# Patient Record
Sex: FEMALE | Race: WHITE | NOT HISPANIC OR LATINO | Employment: STUDENT | URBAN - METROPOLITAN AREA
[De-identification: names, ages, dates, MRNs, and addresses within clinical notes are randomized per-mention and may not be internally consistent; named-entity substitution may affect disease eponyms.]

---

## 2024-01-16 ENCOUNTER — OFFICE VISIT (OUTPATIENT)
Dept: INTERNAL MEDICINE | Facility: CLINIC | Age: 24
End: 2024-01-16
Payer: MEDICAID

## 2024-01-16 VITALS
WEIGHT: 293 LBS | TEMPERATURE: 98 F | OXYGEN SATURATION: 98 % | DIASTOLIC BLOOD PRESSURE: 60 MMHG | HEIGHT: 66 IN | HEART RATE: 91 BPM | SYSTOLIC BLOOD PRESSURE: 112 MMHG | BODY MASS INDEX: 47.09 KG/M2

## 2024-01-16 DIAGNOSIS — Z00.00 HEALTH CARE MAINTENANCE: ICD-10-CM

## 2024-01-16 DIAGNOSIS — M79.672 LEFT FOOT PAIN: ICD-10-CM

## 2024-01-16 DIAGNOSIS — J32.9 SINUSITIS, UNSPECIFIED CHRONICITY, UNSPECIFIED LOCATION: Primary | ICD-10-CM

## 2024-01-16 PROCEDURE — 3078F DIAST BP <80 MM HG: CPT | Mod: CPTII,,, | Performed by: PHYSICIAN ASSISTANT

## 2024-01-16 PROCEDURE — 3074F SYST BP LT 130 MM HG: CPT | Mod: CPTII,,, | Performed by: PHYSICIAN ASSISTANT

## 2024-01-16 PROCEDURE — 1159F MED LIST DOCD IN RCRD: CPT | Mod: CPTII,,, | Performed by: PHYSICIAN ASSISTANT

## 2024-01-16 PROCEDURE — 99205 OFFICE O/P NEW HI 60 MIN: CPT | Mod: PBBFAC | Performed by: PHYSICIAN ASSISTANT

## 2024-01-16 PROCEDURE — 1160F RVW MEDS BY RX/DR IN RCRD: CPT | Mod: CPTII,,, | Performed by: PHYSICIAN ASSISTANT

## 2024-01-16 PROCEDURE — 99204 OFFICE O/P NEW MOD 45 MIN: CPT | Mod: S$PBB,,, | Performed by: PHYSICIAN ASSISTANT

## 2024-01-16 PROCEDURE — 99999 PR PBB SHADOW E&M-NEW PATIENT-LVL V: CPT | Mod: PBBFAC,,, | Performed by: PHYSICIAN ASSISTANT

## 2024-01-16 PROCEDURE — 3008F BODY MASS INDEX DOCD: CPT | Mod: CPTII,,, | Performed by: PHYSICIAN ASSISTANT

## 2024-01-16 RX ORDER — GUAIFENESIN AND PSEUDOEPHEDRINE HCL 1200; 120 MG/1; MG/1
1200 TABLET, EXTENDED RELEASE ORAL 2 TIMES DAILY
COMMUNITY
Start: 2024-01-08

## 2024-01-17 NOTE — PROGRESS NOTES
Subjective:       Patient ID: Tawnya Sanz is a 23 y.o. female.        Chief Complaint: Referral (Referral to ENT and Gyno)    Tawnya Sanz is an established patient of , Primary Doctor here today for urgent care visit.    She had a CT of her sinuses when in [place], where she is from.  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  She was told she needed surgery so asking for an ENT referral.  She has chronic post nasal drip and cough x 3 months.  She has pain in her left maxillary sinus and teeth.    She needs a GYN referral for routine exam.    She needs a PCP.      She has chronic left foot pain x 2 years and requests podiatry referral.           Review of Systems   Constitutional:  Negative for chills, diaphoresis, fatigue and fever.   HENT:  Positive for postnasal drip, sinus pressure and sinus pain. Negative for congestion and sore throat.    Eyes:  Negative for visual disturbance.   Respiratory:  Positive for cough. Negative for chest tightness and shortness of breath.    Cardiovascular:  Negative for chest pain, palpitations and leg swelling.   Gastrointestinal:  Negative for abdominal pain, blood in stool, constipation, diarrhea, nausea and vomiting.   Genitourinary:  Negative for dysuria, frequency, hematuria and urgency.   Musculoskeletal:  Positive for arthralgias. Negative for back pain.   Skin:  Negative for rash.   Neurological:  Negative for dizziness, syncope, weakness and headaches.   Psychiatric/Behavioral:  Negative for dysphoric mood and sleep disturbance. The patient is not nervous/anxious.        Objective:      Physical Exam  Vitals and nursing note reviewed.   Constitutional:       Appearance: Normal appearance. She is well-developed.   HENT:      Head: Normocephalic.      Right Ear: Tympanic membrane and external ear normal.      Left Ear: Tympanic membrane and external ear normal.      Nose: No mucosal edema or rhinorrhea.      Left Sinus: Maxillary sinus tenderness  "present.      Mouth/Throat:      Pharynx: Oropharynx is clear.   Eyes:      Pupils: Pupils are equal, round, and reactive to light.   Cardiovascular:      Rate and Rhythm: Normal rate and regular rhythm.      Heart sounds: Normal heart sounds. No murmur heard.     No friction rub. No gallop.   Pulmonary:      Effort: Pulmonary effort is normal. No respiratory distress.      Breath sounds: Normal breath sounds.   Abdominal:      Palpations: Abdomen is soft.      Tenderness: There is no abdominal tenderness.   Skin:     General: Skin is warm and dry.   Neurological:      Mental Status: She is alert.         Assessment:       1. Sinusitis, unspecified chronicity, unspecified location    2. Health care maintenance    3. Left foot pain        Plan:       Tawnya was seen today for referral.    Diagnoses and all orders for this visit:    Sinusitis, unspecified chronicity, unspecified location  -     Ambulatory referral/consult to ENT; Future    Health care maintenance  -     Ambulatory referral/consult to Gynecology; Future    Left foot pain  -     Ambulatory referral/consult to Podiatry; Future    Referrals placed  Will message Dr. Rao to see if she has any new patient availability    Pt has been given instructions populated from patient instructions database and has verbalized understanding of the after visit summary and information contained wherein.    Follow up with a primary care provider. May go to ER for acute shortness of breath, lightheadedness, fever, or any other emergent complaints or changes in condition.    "This note will be shared with the patient"    Future Appointments   Date Time Provider Department Center   2/20/2024  3:30 PM Cora Malagon MD [facility]   3/1/2024  3:00 PM Sampson Jiménez MD [facility]                 "

## 2024-01-22 ENCOUNTER — OFFICE VISIT (OUTPATIENT)
Dept: OTOLARYNGOLOGY | Facility: CLINIC | Age: 24
End: 2024-01-22
Payer: MEDICAID

## 2024-01-22 VITALS
WEIGHT: 293 LBS | DIASTOLIC BLOOD PRESSURE: 78 MMHG | HEART RATE: 90 BPM | SYSTOLIC BLOOD PRESSURE: 124 MMHG | BODY MASS INDEX: 51.45 KG/M2

## 2024-01-22 DIAGNOSIS — J34.3 HYPERTROPHY OF BOTH INFERIOR NASAL TURBINATES: ICD-10-CM

## 2024-01-22 DIAGNOSIS — J32.9 SINUSITIS, UNSPECIFIED CHRONICITY, UNSPECIFIED LOCATION: ICD-10-CM

## 2024-01-22 DIAGNOSIS — J34.2 NASAL SEPTAL DEVIATION: Primary | ICD-10-CM

## 2024-01-22 DIAGNOSIS — J34.1 MUCOCELE OF MAXILLARY SINUS: ICD-10-CM

## 2024-01-22 PROCEDURE — 99213 OFFICE O/P EST LOW 20 MIN: CPT | Mod: PBBFAC | Performed by: STUDENT IN AN ORGANIZED HEALTH CARE EDUCATION/TRAINING PROGRAM

## 2024-01-22 PROCEDURE — 1160F RVW MEDS BY RX/DR IN RCRD: CPT | Mod: CPTII,,, | Performed by: STUDENT IN AN ORGANIZED HEALTH CARE EDUCATION/TRAINING PROGRAM

## 2024-01-22 PROCEDURE — 99205 OFFICE O/P NEW HI 60 MIN: CPT | Mod: 25,S$PBB,, | Performed by: STUDENT IN AN ORGANIZED HEALTH CARE EDUCATION/TRAINING PROGRAM

## 2024-01-22 PROCEDURE — 3008F BODY MASS INDEX DOCD: CPT | Mod: CPTII,,, | Performed by: STUDENT IN AN ORGANIZED HEALTH CARE EDUCATION/TRAINING PROGRAM

## 2024-01-22 PROCEDURE — 99999 PR PBB SHADOW E&M-EST. PATIENT-LVL III: CPT | Mod: PBBFAC,,, | Performed by: STUDENT IN AN ORGANIZED HEALTH CARE EDUCATION/TRAINING PROGRAM

## 2024-01-22 PROCEDURE — 1159F MED LIST DOCD IN RCRD: CPT | Mod: CPTII,,, | Performed by: STUDENT IN AN ORGANIZED HEALTH CARE EDUCATION/TRAINING PROGRAM

## 2024-01-22 PROCEDURE — 3078F DIAST BP <80 MM HG: CPT | Mod: CPTII,,, | Performed by: STUDENT IN AN ORGANIZED HEALTH CARE EDUCATION/TRAINING PROGRAM

## 2024-01-22 PROCEDURE — 3074F SYST BP LT 130 MM HG: CPT | Mod: CPTII,,, | Performed by: STUDENT IN AN ORGANIZED HEALTH CARE EDUCATION/TRAINING PROGRAM

## 2024-01-22 PROCEDURE — 87070 CULTURE OTHR SPECIMN AEROBIC: CPT | Performed by: STUDENT IN AN ORGANIZED HEALTH CARE EDUCATION/TRAINING PROGRAM

## 2024-01-22 PROCEDURE — 87075 CULTR BACTERIA EXCEPT BLOOD: CPT | Performed by: STUDENT IN AN ORGANIZED HEALTH CARE EDUCATION/TRAINING PROGRAM

## 2024-01-22 NOTE — PROGRESS NOTES
Otolaryngology - Head and Neck Surgery New Patient Visit    1/22/2024    Referring Provider: Shawnee Valenzuela PA-C    Chief Complaint   Patient presents with    Sinusitis     History of Present Illness, Otolaryngology Specialty-Specific Exam, and Assessment and Plan:     Tawnya Sanz is a 23 y.o. female who presents for evaluation of recurrent sinus infections, which has been present for 8 months. She complains of having her wisdom teeth pulled in Jan 2023. There was some concern for possible OA fistula but was seen by OMFS in [place]. She reports in April of 2023 started to have dental pain, facial pressure, purulent nasal drainage> Was recently seen in [place] where CT scan was performed that was concerning for mucocele of the left maxillary sinus. 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 She has been treated with amoxicillin, Augmentin and doxycycline previously. She was seen in [place] where a nasal swab was performed, no predominant colonies identified. Since finishing the antibiotics has been using budesonide irrigations. She reports improvement in her symptoms, however cough still persists. She denies vision changes, epistaxis, previous nasal trauma, anosmia, or clear nasal drainage. She has never had allergy testing. She denies previous skullbase surgery. She denies snoring. The assessment of quality and severity of symptoms as measured by the SNOT-22 score is 24 and the STOP-BANG score is 2.     She had a CT of the sinuses done on 12/27/23 which I reviewed along with the associated radiology report.    On exam today, the ears are normal. The oral cavity is clear. The neck is clear. The nasopharynx, hypopharynx, and larynx are normal. Nasal endoscopy reveals left septal deviation with spur. Hypertrophic inferior turbinates bilaterally. No nasal masses or nasal polyposis. Purulent drainage in the left middle meatus with significant edema and inflammation involving the  left uncinate process. Nasopharynx clear without lesions. Eustachian tubes WNL.    Impression today is chronic sinusitis, possible mucocele involving the left maxillary sinus.     She would benefit from STESS for the treatment of her condition.  I discussed the risks, benefits and alternatives to surgery with the patient, as well as the expected postoperative course.  I gave her the opportunity to ask questions and I answered all of them.  I provided relevant printed information on her condition for her to review at home. She will return for a postoperative visit 1 week after surgery.    The risks and benefits of endoscopic surgery were discussed with the patient in detail, which include but are not limited to pain, bleeding, infection, the need for reoperation, injury to orbit or orbital contents, injury to brain or meninges, and unforeseeable complications of surgery including myocardial infarction, stroke or pulmonary embolus.    Cultures of the sinuses were obtained today to see if we can get better antibiotic directed cultures.  She has a pretty significant aversion to undergo general anesthesia with personal history of a family member having an undesirable outcome.  I stressed the if timely surgical intervention is not performed infection may progress outside her sinuses to involve her eye or even intracranially.  My hope is to get antibiotic directed cultures to assist in keeping for infection at a manageable state, however I am concerned with her CT findings showing a possible mucocele with complete obstruction of the left ostiomeatal complex.     Thank you for allowing us to participate in the care of your patient. We will continue to keep you informed of her progress.    Sincerely yours,    Sampson Jiménez MD      Objective     Physical Examination  Vitals -  weight is 144.6 kg (318 lb 12.6 oz) (abnormal). Her blood pressure is 124/78 and her pulse is 90.   Constitutional - General appearance: Normal.  Ability to communicate: Normal.  Head & Face - Overall appearance, scars, masses: Normal. Palpation &/or percussion of face: Normal. Salivary glands: Normal. Facial strength: Normal  ENMT - Otoscopic exam: Normal. Assessment of hearing: Normal. External inspection: Normal. Nasal mucosa, septum, turbinates: Abnormal see exam details. Lips, teeth, gums: Normal. Oropharynx: Normal. Pharyngeal walls/pyriform sinus: Normal. Larynx: Normal. Nasopharynx: Normal  Neck - Neck: Normal. Thyroid: Normal  Lymphatic - Palpation of lymph nodes: Normal  Eyes - Ocular mobility: Normal  Respiratory - Inspection of Chest: Normal  Cardiovascular - Peripheral vascular system: Normal  Neurological/Psychiatric - Orientation: Normal    Review of Systems  A complete review of systems was obtained 01/22/2024 and reviewed.  The review of systems is negative for symptoms except as described above.    /78 (BP Location: Left arm, Patient Position: Sitting, BP Method: Large (Automatic))   Pulse 90   Wt (!) 144.6 kg (318 lb 12.6 oz)   BMI 51.45 kg/m²      Nasal Endoscopy:  1/22/2024    The use of diagnostic nasal endoscopy was considered medically necessary for the evaluation and visualization of the nasal anatomy for symptoms suggestive of nasal or sinus origin. Physical examination (including a nasal speculum evaluation) did not provide sufficient clinical information to establish a diagnosis, or symptoms did not improve or worsened following treatment.     The nasal cavity was decongested with topical 1% phenylephrine and anesthetized with 4% lidocaine.  A rigid 0-degree endoscope was introduced into the nasal cavity.    The patient was seated in the examination chair. After discussion of risks and benefits, a nasal endoscope was inserted into the nose the endoscope was passed along the left nasal floor to the nasopharynx. It was then passed between the middle and superior meatus, nasal turbinates, nasal septum, nasopharynx and  "sphenoethmoid region. The nasal endoscope was withdrawn and there was no complications. An identical procedure was performed on the right side. I was present for the entire procedure.The patient tolerated the above procedure well. The findings of this procedure can be found in the dictated note from 1/22/2024 visit.                                        Data Reviewed    No results found for: "WBC"  No results found for: "EOSINOPHIL"  No results found for: "EOS"  No results found for: "PLT"  No results found for: "GLU"  No results found for: "IGE"    I independently reviewed the images of the CT sinuses dated 12/27/23. Pertinent findings include opacification of the left frontal , ethmoid, maxillary and sphenoid sinuses with bubbles within the sphenoid and frontal sinuses. Expansion of the left maxillary sinus and uncinate with complete [facility] obstruction. Mucosal thickening of the right maxillary sinus with mucosal thickening involving the frontal and ethmoids. Relative sparing of the right sphenoid.                                        "

## 2024-01-23 ENCOUNTER — TELEPHONE (OUTPATIENT)
Dept: INTERNAL MEDICINE | Facility: CLINIC | Age: 24
End: 2024-01-23
Payer: MEDICAID

## 2024-01-23 NOTE — TELEPHONE ENCOUNTER
----- Message from Shawnee Valenzuela PA-C sent at 1/23/2024  6:39 AM CST -----  Does Dr. ARGUELLES have any new patient appointments to establish care left?    [place],  Shawnee

## 2024-01-24 ENCOUNTER — OFFICE VISIT (OUTPATIENT)
Dept: INTERNAL MEDICINE | Facility: CLINIC | Age: 24
End: 2024-01-24
Payer: MEDICAID

## 2024-01-24 ENCOUNTER — PATIENT MESSAGE (OUTPATIENT)
Dept: OTOLARYNGOLOGY | Facility: CLINIC | Age: 24
End: 2024-01-24
Payer: MEDICAID

## 2024-01-24 VITALS
DIASTOLIC BLOOD PRESSURE: 76 MMHG | SYSTOLIC BLOOD PRESSURE: 120 MMHG | BODY MASS INDEX: 47.09 KG/M2 | HEIGHT: 66 IN | WEIGHT: 293 LBS | OXYGEN SATURATION: 98 % | HEART RATE: 100 BPM

## 2024-01-24 DIAGNOSIS — L73.9 FOLLICULITIS: Primary | ICD-10-CM

## 2024-01-24 LAB — BACTERIA SPEC AEROBE CULT: NORMAL

## 2024-01-24 PROCEDURE — 3074F SYST BP LT 130 MM HG: CPT | Mod: CPTII,,,

## 2024-01-24 PROCEDURE — 99999 PR PBB SHADOW E&M-EST. PATIENT-LVL III: CPT | Mod: PBBFAC,,,

## 2024-01-24 PROCEDURE — 3078F DIAST BP <80 MM HG: CPT | Mod: CPTII,,,

## 2024-01-24 PROCEDURE — 99213 OFFICE O/P EST LOW 20 MIN: CPT | Mod: PBBFAC

## 2024-01-24 PROCEDURE — 3008F BODY MASS INDEX DOCD: CPT | Mod: CPTII,,,

## 2024-01-24 PROCEDURE — 99213 OFFICE O/P EST LOW 20 MIN: CPT | Mod: S$PBB,,,

## 2024-01-24 RX ORDER — MUPIROCIN 20 MG/G
OINTMENT TOPICAL 3 TIMES DAILY
Qty: 22 G | Refills: 0 | Status: SHIPPED | OUTPATIENT
Start: 2024-01-24 | End: 2024-01-26 | Stop reason: SDUPTHER

## 2024-01-24 NOTE — PROGRESS NOTES
Tawnya Sanz  2000        Subjective     Reason for Visit:    History of Present Illness:  Ms. Tawnya Sanz is a 23 y.o. female with a history of recurrent sinus infections who presents to clinic for rash on b/l legs.     Rash is located on b/l upper and lower inner thigh. States that last week started as an itch, and then this morning noted a painful rash, which she describes as a painful itchy pimple, one located on either side. 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 Has been using new detergent and was wearing new articles of clothing. Last week shaved and after that noted itching. No history of asthma, atopy, or eczema. No recent outdoor exposure or recent bug bites. Has not occurred before. Has not tried any OTC medications for it. Not associated with increased warmth or fluctuance.     Review of Systems   Constitutional:  Negative for chills and fever.   Respiratory:  Negative for shortness of breath.    Musculoskeletal:  Negative for myalgias.   Skin:  Positive for rash.   Neurological:  Negative for sensory change.        PAST HISTORY:     Past Medical History:   Diagnosis Date    Chronic sinusitis, unspecified     Obesity, unspecified        Past Surgical History:   Procedure Laterality Date    WISDOM TOOTH EXTRACTION         Family History   Problem Relation Age of Onset    Cancer Mother         thyroid    Hypertension Mother     Heart failure Mother     Diabetes Mother     Cancer Father         skin - unsure type       Social History     Socioeconomic History    Marital status: Single   Occupational History    Occupation: medical student   Tobacco Use    Smoking status: Never    Smokeless tobacco: Never   Substance and Sexual Activity    Drug use: Never    Sexual activity: Never   Social History Narrative    Medical student at      Social Determinants of Health     Financial Resource Strain: Low Risk  (1/15/2024)    Overall Financial Resource Strain (CARDIA)     Difficulty of Paying  "Living Expenses: Not very hard   Food Insecurity: No Food Insecurity (1/15/2024)    Hunger Vital Sign     Worried About Running Out of Food in the Last Year: Never true     Ran Out of Food in the Last Year: Never true   Transportation Needs: No Transportation Needs (1/15/2024)    PRAPARE - Transportation     Lack of Transportation (Medical): No     Lack of Transportation (Non-Medical): No   Physical Activity: Unknown (1/15/2024)    Exercise Vital Sign     Days of Exercise per Week: 0 days   Stress: Stress Concern Present (1/15/2024)    [place] [employer] - Occupational Stress Questionnaire     Feeling of Stress : To some extent   Social Connections: Unknown (1/15/2024)    Social Connection and Isolation Panel [NHANES]     Frequency of Communication with Friends and Family: More than three times a week     Frequency of Social Gatherings with Friends and Family: More than three times a week     Active Member of Clubs or Organizations: Yes     Attends Club or Organization Meetings: 1 to 4 times per year     Marital Status: Never    Housing Stability: High Risk (1/15/2024)    Housing Stability Vital Sign     Unable to Pay for Housing in the Last Year: No     Number of Places Lived in the Last Year: 3     Unstable Housing in the Last Year: No       MEDICATIONS & ALLERGIES:     Current Outpatient Medications on File Prior to Visit   Medication Sig    BUDESONIDE 0.6 MG/NORMAL SALINE 50 ML NASAL IRRIGATION Patient to irrigate each nostril with 25ml twice daily.    pseudoephedrine-guaiFENesin (MUCINEX D MAXIMUM STRENGTH) 120-1,200 mg Tb12 Take 1,200 mg by mouth 2 (two) times a day.     No current facility-administered medications on file prior to visit.       Review of patient's allergies indicates:  No Known Allergies    OBJECTIVE:        Vital Signs:  Vitals:    01/24/24 1448   BP: 120/76   Pulse: 100   SpO2: 98%   Weight: (!) 143.9 kg (317 lb 3.9 oz)   Height: 5' 6" (1.676 m)       Body mass " "index is 51.2 kg/m².     Physical Exam:  Physical Exam  Constitutional:       General: She is not in acute distress.     Appearance: Normal appearance. She is not ill-appearing.   HENT:      Head: Normocephalic and atraumatic.   Eyes:      Extraocular Movements: Extraocular movements intact.      Conjunctiva/sclera: Conjunctivae normal.   Musculoskeletal:      Right lower leg: No edema.      Left lower leg: No edema.   Skin:     General: Skin is warm.      Findings: Rash present.   Neurological:      General: No focal deficit present.      Mental Status: She is oriented to person, place, and time.   Psychiatric:         Mood and Affect: Mood normal.         Behavior: Behavior normal.                  Laboratory  No results found for: "WBC", "HGB", "HCT", "MCV", "PLT"  No results found for: "GLU", "NA", "K", "CL", "CO2", "BUN", "CREATININE", "CALCIUM", "MG", "PROT", "BILITOT", "ALKPHOS", "ALT", "AST"  No results found for: "INR", "PROTIME"  No results found for: "CHOL", "HDL", "LDLCALC", "TRIG"  No results found for: "HGBA1C"  No results found for: "TSH"  No results for input(s): "POCTGLUCOSE" in the last 72 hours.       Health Maintenance         Date Due Completion Date    Hepatitis C Screening Never done ---    Lipid Panel Never done ---    COVID-19 Vaccine (1) Never done ---    HIV Screening Never done ---    Pap Smear Never done ---    TETANUS VACCINE 06/14/2028 6/14/2018                ASSESSMENT & PLAN:       Ms. Tawnya Sanz is a 23 y.o. female who was seen today in clinic for rash. Concerning for irritant dermatitis on upper inner thigh and folliculitis on b/l lower inner thigh.   Discussed modifications with loose fitting clothing, use of barrier cream in upper inner thigh, and using the Bactroban and warm compression for lesions on bilateral lower inner thigh.      All questions answered. In office handout given for folliculitis and dermatitis.    Tawnya was seen today for rash.    Diagnoses and " all orders for this visit:    Folliculitis  -     mupirocin (BACTROBAN) 2 % ointment; Apply topically 3 (three) times daily.         1. Folliculitis        No follow-ups on file.    Other Orders Placed This Visit:  No orders of the defined types were placed in this encounter.          Discussed with Dr. Morgan - staff attestation to follow    Angie Gaitan MD  Internal Medicine, PGY-2  01/24/2024.2:53 PM  [facility]  Internal Medicine Resident Clinic  [address] [place] [id number]  [phone]  [email or web address]

## 2024-01-24 NOTE — PATIENT INSTRUCTIONS
Use barrier cream on inner thigh and bactroban and lower thigh lesions.   Also recommend use of warm compress.

## 2024-01-25 ENCOUNTER — PATIENT MESSAGE (OUTPATIENT)
Dept: INTERNAL MEDICINE | Facility: CLINIC | Age: 24
End: 2024-01-25
Payer: MEDICAID

## 2024-01-26 ENCOUNTER — TELEPHONE (OUTPATIENT)
Dept: INTERNAL MEDICINE | Facility: CLINIC | Age: 24
End: 2024-01-26
Payer: MEDICAID

## 2024-01-26 ENCOUNTER — OFFICE VISIT (OUTPATIENT)
Dept: INTERNAL MEDICINE | Facility: CLINIC | Age: 24
End: 2024-01-26
Payer: MEDICAID

## 2024-01-26 VITALS
HEART RATE: 100 BPM | OXYGEN SATURATION: 98 % | BODY MASS INDEX: 47.09 KG/M2 | WEIGHT: 293 LBS | DIASTOLIC BLOOD PRESSURE: 76 MMHG | HEIGHT: 66 IN | SYSTOLIC BLOOD PRESSURE: 122 MMHG

## 2024-01-26 DIAGNOSIS — L73.9 FOLLICULITIS: ICD-10-CM

## 2024-01-26 DIAGNOSIS — L03.115 CELLULITIS OF RIGHT THIGH: Primary | ICD-10-CM

## 2024-01-26 PROCEDURE — 99214 OFFICE O/P EST MOD 30 MIN: CPT | Mod: S$PBB,,,

## 2024-01-26 PROCEDURE — 1159F MED LIST DOCD IN RCRD: CPT | Mod: CPTII,,,

## 2024-01-26 PROCEDURE — 3008F BODY MASS INDEX DOCD: CPT | Mod: CPTII,,,

## 2024-01-26 PROCEDURE — 3078F DIAST BP <80 MM HG: CPT | Mod: CPTII,,,

## 2024-01-26 PROCEDURE — 99214 OFFICE O/P EST MOD 30 MIN: CPT | Mod: PBBFAC

## 2024-01-26 PROCEDURE — 3074F SYST BP LT 130 MM HG: CPT | Mod: CPTII,,,

## 2024-01-26 PROCEDURE — 99999 PR PBB SHADOW E&M-EST. PATIENT-LVL IV: CPT | Mod: PBBFAC,,,

## 2024-01-26 PROCEDURE — 1160F RVW MEDS BY RX/DR IN RCRD: CPT | Mod: CPTII,,,

## 2024-01-26 RX ORDER — MUPIROCIN 20 MG/G
OINTMENT TOPICAL 3 TIMES DAILY
Qty: 22 G | Refills: 2 | Status: SHIPPED | OUTPATIENT
Start: 2024-01-26 | End: 2024-03-26

## 2024-01-26 RX ORDER — CEPHALEXIN 500 MG/1
500 CAPSULE ORAL EVERY 8 HOURS
Qty: 30 CAPSULE | Refills: 0 | Status: SHIPPED | OUTPATIENT
Start: 2024-01-26 | End: 2024-02-05

## 2024-01-26 RX ORDER — MUPIROCIN 20 MG/G
OINTMENT TOPICAL 3 TIMES DAILY
Qty: 22 G | Refills: 0 | Status: CANCELLED | OUTPATIENT
Start: 2024-01-26

## 2024-01-26 NOTE — TELEPHONE ENCOUNTER
----- Message from Preston Chapa sent at 1/26/2024 12:57 PM CST -----  Type:  Same Day Appointment Request    Caller is requesting a same day appointment.  Caller declined first available appointment listed below.    Name of Caller:  Tawnya  When is the first available appointment?   Symptoms: rash   Best Call Back Number: [phone]  Additional Information:  no

## 2024-01-26 NOTE — PATIENT INSTRUCTIONS
Keep affected area clean and dry; consider using antimicrobial soap or cleansing agents such as Phisohex.

## 2024-01-27 LAB — BACTERIA SPEC ANAEROBE CULT: NORMAL

## 2024-01-27 NOTE — PROGRESS NOTES
[facility]  CLINIC NOTE    Patient Name: Tawnya Sanz  YOB: 2000    Subjective:      Chief Complaint: Rash    HPI  Tawnya Sanz is a 23 y.o. female with a hx of recurrent sinus infections presenting to clinic for progressively worsening rash on b/l legs. Recently seen in resident clinic 2 days ago for the same rash which was noted to have single pustule with surrounding erythema on the right thigh. Since then, the pustule has disappeared but there has been rapid progression or expansion of the erythema compared to before R>L despite using mupirocin ointment as prescribed in addition to warm compresses. Associated symptoms of itching and pain, now with increased warmth, redness, and inflammation. Reports subjective fever/chills yesterday but did not take manual temp. Denies recent travel, exposure to outdoors, recent bug bites.      Media images from 01/24/24 of right thigh:        Review of Systems   Constitutional:  Positive for chills and fever.   Respiratory:  Negative for shortness of breath.    Cardiovascular:  Negative for chest pain and leg swelling.   Gastrointestinal:  Negative for nausea and vomiting.   Musculoskeletal:  Negative for joint pain and myalgias.   Skin:  Positive for rash.   Neurological:  Negative for sensory change and weakness.       Past Medical History:   Diagnosis Date    Chronic sinusitis, unspecified     Obesity, unspecified        Past Surgical History:   Procedure Laterality Date    WISDOM TOOTH EXTRACTION         Family History   Problem Relation Age of Onset    Cancer Mother         thyroid    Hypertension Mother     Heart failure Mother     Diabetes Mother     Cancer Father         skin - unsure type       Social History     Socioeconomic History    Marital status: Single   Occupational History    Occupation: medical student   Tobacco Use    Smoking status: Never    Smokeless tobacco: Never   Substance and Sexual Activity     Drug use: Never    Sexual activity: Never   Social History Narrative    Medical student at      Social Determinants of Health     Financial Resource Strain: Low Risk  (1/15/2024)    Overall Financial Resource Strain (CARDIA)     Difficulty of Paying Living Expenses: Not very hard   Food Insecurity: No Food Insecurity (1/15/2024)    Hunger Vital Sign     Worried About Running Out of Food in the Last Year: Never true     Ran Out of Food in the Last Year: Never true   Transportation Needs: No Transportation Needs (1/15/2024)    PRAPARE - Transportation     Lack of Transportation (Medical): No     Lack of Transportation (Non-Medical): No   Physical Activity: Unknown (1/15/2024)    Exercise Vital Sign     Days of Exercise per Week: 0 days   Stress: Stress Concern Present (1/15/2024)    [employer] - Occupational Stress Questionnaire     Feeling of Stress : To some extent   Social Connections: Unknown (1/15/2024)    Social Connection and Isolation Panel [NHANES]     Frequency of Communication with Friends and Family: More than three times a week     Frequency of Social Gatherings with Friends and Family: More than three times a week     Active Member of Clubs or Organizations: Yes     Attends Club or Organization Meetings: 1 to 4 times per year     Marital Status: Never    Housing Stability: High Risk (1/15/2024)    Housing Stability Vital Sign     Unable to Pay for Housing in the Last Year: No     Number of Places Lived in the Last Year: 3     Unstable Housing in the Last Year: No       Medication List with Changes/Refills   New Medications    CEPHALEXIN (KEFLEX) 500 MG CAPSULE    Take 1 capsule (500 mg total) by mouth every 8 (eight) hours. for 10 days   Current Medications    BUDESONIDE 0.6 MG/NORMAL SALINE 50 ML NASAL IRRIGATION    Patient to irrigate each nostril with 25ml twice daily.    PSEUDOEPHEDRINE-GUAIFENESIN (MUCINEX D MAXIMUM STRENGTH) 120-1,200 MG TB12    Take 1,200 mg  "by mouth 2 (two) times a day.   Changed and/or Refilled Medications    Modified Medication Previous Medication    MUPIROCIN (BACTROBAN) 2 % OINTMENT mupirocin (BACTROBAN) 2 % ointment       Apply topically 3 (three) times daily.    Apply topically 3 (three) times daily.       There is no problem list on file for this patient.              Objective:      /76   Pulse 100   Ht 5' 6" (1.676 m)   Wt (!) 144.8 kg (319 lb 3.6 oz)   SpO2 98%   BMI 51.52 kg/m²   Estimated body mass index is 51.52 kg/m² as calculated from the following:    Height as of this encounter: 5' 6" (1.676 m).    Weight as of this encounter: 144.8 kg (319 lb 3.6 oz).      Physical Exam  Vitals reviewed.   Constitutional:       General: She is not in acute distress.     Appearance: Normal appearance. She is not ill-appearing.   HENT:      Head: Normocephalic and atraumatic.   Cardiovascular:      Rate and Rhythm: Normal rate and regular rhythm.      Pulses: Normal pulses.   Pulmonary:      Effort: Pulmonary effort is normal. No respiratory distress.   Musculoskeletal:         General: Normal range of motion.      Right lower leg: No edema.      Left lower leg: No edema.   Skin:     General: Skin is warm and dry.      Coloration: Skin is not jaundiced.      Findings: Erythema and rash present.   Neurological:      General: No focal deficit present.      Mental Status: She is alert and oriented to person, place, and time.      Cranial Nerves: No cranial nerve deficit.      Motor: No weakness.       Images available in media.     Right thigh:          Left thigh:        Assessment and Plan:   23 year old female with hx recurrent sinusitis presenting to clinic today for worsening rash on bilateral thighs R>L since initial evaluation 2 days ago, now with induration, increased swelling, redness, tenderness, and warmth c/w cellulitis.     --will treat for cellulitis/skin & soft tissue infection with oral cephalexin 500 mg Q8H for 10 days.   --area " of demarcation over affected area was marked during this clinic visit to monitor for any changes.   --advised to maintain proper hygiene/care of affected area including:   --elevation of the leg above the level of the heart as able to help w pain and swelling.    --keep the infected area clean and dry, can use antimicrobial soap or cleansing solutions to wash the affected area.    --use clean, warm compresses to help ease pain and swelling.     --advised to monitor for worsening infection and seek medical attention for signs such as high grade fever of 100.4°F (38°C) or higher, chills, or wound that will not heal, area becomes more red, swollen, or painful, the redness or swelling spreads to a larger area, or the infected area is not better after 3 days of taking antibiotics.        Problem List Items Addressed This Visit    None  Visit Diagnoses       Cellulitis of right thigh    -  Primary    Relevant Medications    cephALEXin (KEFLEX) 500 MG capsule    Folliculitis        Relevant Medications    mupirocin (BACTROBAN) 2 % ointment            Follow Up:       Tawnya was seen today for rash.    Diagnoses and all orders for this visit:    Cellulitis of right thigh  -     cephALEXin (KEFLEX) 500 MG capsule; Take 1 capsule (500 mg total) by mouth every 8 (eight) hours. for 10 days    Folliculitis  -     mupirocin (BACTROBAN) 2 % ointment; Apply topically 3 (three) times daily.        Interview, Assessment, Findings, and Plan discussed with Dr. Murrieta.     Follow up if symptoms worsen or fail to improve.    Cha Vasquez MD   Internal Medicine, PGY1  [facility]

## 2024-01-30 ENCOUNTER — PATIENT MESSAGE (OUTPATIENT)
Dept: OTOLARYNGOLOGY | Facility: CLINIC | Age: 24
End: 2024-01-30
Payer: MEDICAID

## 2024-02-09 ENCOUNTER — PATIENT MESSAGE (OUTPATIENT)
Dept: OTOLARYNGOLOGY | Facility: CLINIC | Age: 24
End: 2024-02-09
Payer: MEDICAID

## 2024-02-09 DIAGNOSIS — J34.1 MUCOCELE OF MAXILLARY SINUS: Primary | ICD-10-CM

## 2024-02-14 ENCOUNTER — PATIENT MESSAGE (OUTPATIENT)
Dept: INTERNAL MEDICINE | Facility: CLINIC | Age: 24
End: 2024-02-14
Payer: MEDICAID

## 2024-02-16 ENCOUNTER — TELEPHONE (OUTPATIENT)
Dept: INTERNAL MEDICINE | Facility: CLINIC | Age: 24
End: 2024-02-16
Payer: MEDICAID

## 2024-02-16 NOTE — TELEPHONE ENCOUNTER
----- Message from Juan Delvalle sent at 2/14/2024  2:24 PM CST -----  Type:  Patient Returning Call    Who Called:pt  Who Left Message for Patient:  Does the patient know what this is regarding?:appt- NP  Would the patient rather a call back or a response via [employer]? Call  Best Call Back Number: [phone]  Additional Information: pt states she would like to see if she can est care with provider . Thank you

## 2024-02-20 ENCOUNTER — OFFICE VISIT (OUTPATIENT)
Dept: OBSTETRICS AND GYNECOLOGY | Facility: CLINIC | Age: 24
End: 2024-02-20
Payer: MEDICAID

## 2024-02-20 VITALS
SYSTOLIC BLOOD PRESSURE: 132 MMHG | HEIGHT: 66 IN | DIASTOLIC BLOOD PRESSURE: 78 MMHG | WEIGHT: 293 LBS | BODY MASS INDEX: 47.09 KG/M2

## 2024-02-20 DIAGNOSIS — E28.2 POLYCYSTIC OVARIAN SYNDROME: ICD-10-CM

## 2024-02-20 DIAGNOSIS — N91.5 OLIGOMENORRHEA, UNSPECIFIED TYPE: ICD-10-CM

## 2024-02-20 DIAGNOSIS — Z01.419 WELL WOMAN EXAM WITH ROUTINE GYNECOLOGICAL EXAM: Primary | ICD-10-CM

## 2024-02-20 DIAGNOSIS — Z00.00 HEALTH CARE MAINTENANCE: ICD-10-CM

## 2024-02-20 DIAGNOSIS — Z12.4 CERVICAL CANCER SCREENING: ICD-10-CM

## 2024-02-20 DIAGNOSIS — Z86.018 HISTORY OF CHANGING SKIN MOLE: ICD-10-CM

## 2024-02-20 PROCEDURE — 3078F DIAST BP <80 MM HG: CPT | Mod: CPTII,,, | Performed by: OBSTETRICS & GYNECOLOGY

## 2024-02-20 PROCEDURE — 3075F SYST BP GE 130 - 139MM HG: CPT | Mod: CPTII,,, | Performed by: OBSTETRICS & GYNECOLOGY

## 2024-02-20 PROCEDURE — 1159F MED LIST DOCD IN RCRD: CPT | Mod: CPTII,,, | Performed by: OBSTETRICS & GYNECOLOGY

## 2024-02-20 PROCEDURE — 99999 PR PBB SHADOW E&M-EST. PATIENT-LVL III: CPT | Mod: PBBFAC,,, | Performed by: OBSTETRICS & GYNECOLOGY

## 2024-02-20 PROCEDURE — 3008F BODY MASS INDEX DOCD: CPT | Mod: CPTII,,, | Performed by: OBSTETRICS & GYNECOLOGY

## 2024-02-20 PROCEDURE — 99385 PREV VISIT NEW AGE 18-39: CPT | Mod: S$PBB,,, | Performed by: OBSTETRICS & GYNECOLOGY

## 2024-02-20 PROCEDURE — 99213 OFFICE O/P EST LOW 20 MIN: CPT | Mod: PBBFAC,PN | Performed by: OBSTETRICS & GYNECOLOGY

## 2024-02-20 RX ORDER — MEDROXYPROGESTERONE ACETATE 10 MG/1
10 TABLET ORAL DAILY
Qty: 10 TABLET | Refills: 0 | Status: SHIPPED | OUTPATIENT
Start: 2024-02-20 | End: 2025-02-19

## 2024-02-20 NOTE — PROGRESS NOTES
"Past medical, surgical, social, family, and obstetric histories; medications; prior records and results; and available outside records were reviewed and updated in the EMR.  Pertinent findings were noted below.    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    New patient: Yes    Menopausal: No    History of abnormal paps: N/A  Abnormal or postmenopausal bleeding:  cycles have always been irregular, sometimes gets them every month, but sometimes will skip several months, hasn't gone >3 months without a cycle  History of abnormal mammograms:N/A   Family history of breast or ovarian cancer: DENIES  Any breast masses, pain, skin changes, or nipple discharge: DENIES  Possible recent STD exposure: N/A  Contraception: None - never been sexually active     +chin hair, has to pluck    Pap: No result found, No recent documented pap  Mammogram: N/A  Allergies: Patient has no known allergies.    Review of Systems   Constitutional:  Negative for chills and fever.   Eyes:  Negative for visual disturbance.   Respiratory:  Negative for cough and shortness of breath.    Cardiovascular:  Negative for chest pain and leg swelling.   Gastrointestinal:  Negative for abdominal pain, nausea and vomiting.   Genitourinary:  Positive for menstrual problem and pelvic pain (intermittent). Negative for dysuria, flank pain, frequency, urgency and vaginal bleeding.   Integumentary:  Negative for breast mass.   Neurological:  Negative for syncope and headaches.   Psychiatric/Behavioral:  Negative for depression. The patient is not nervous/anxious.    All other systems reviewed and are negative.  Breast: Negative for mass and mastodynia      Exam   /78   Ht 5' 6" (1.676 m)   Wt (!) 144 kg (317 lb 7.4 oz)   LMP 02/10/2024   BMI 51.24 kg/m²     Physical Exam  Constitutional:       General: She is not in acute distress.     Appearance: Normal appearance. She is obese. She " is not ill-appearing.     Genitourinary: Breasts:     Breasts are soft.     Right: No mass, nipple discharge, skin change or tenderness.      Left: No mass, nipple discharge, skin change or tenderness.   HENT:      Head: Normocephalic and atraumatic.   Pulmonary:      Effort: Pulmonary effort is normal.   Musculoskeletal:         General: Normal range of motion.   Lymphadenopathy:      Upper Body:      Right upper body: No axillary adenopathy.      Left upper body: No axillary adenopathy.   Neurological:      General: No focal deficit present.      Mental Status: She is alert and oriented to person, place, and time.   Psychiatric:         Mood and Affect: Mood normal.         Behavior: Behavior normal.         Thought Content: Thought content normal.         Judgment: Judgment normal.   Vitals reviewed.     DECLINED PELVIC  Assessment and Plan   Well woman exam with routine gynecological exam    Cervical cancer screening    Health care maintenance  -     Ambulatory referral/consult to Gynecology  -     HEMOGLOBIN A1C; Future; Expected date: 02/20/2024  -     LIPID PANEL; Future; Expected date: 02/20/2024    Oligomenorrhea, unspecified type  -     medroxyPROGESTERone (PROVERA) 10 MG tablet; Take 1 tablet (10 mg total) by mouth once daily.  Dispense: 10 tablet; Refill: 0    History of changing skin mole  -     Ambulatory referral/consult to Dermatology; Future; Expected date: 02/27/2024    Polycystic ovarian syndrome      Annual exam  Breast and pelvic exam: breast exam WNL, declined pelvic as she hasn't initiated intercourse  Patient was counseled on ASCCP guidelines for cervical cytology screening  Cervical screening: declined as she has never been sexually active, counseled to have done after first year of initiating intercourse  Patient was counseled on current recommendations for breast cancer screening  Mammogram screening: @ 41yo unless new risk factors warrant earlier screening  VitD/Ca supplementation  recommendations based on age:  <50yoa - Ca 1000mg/day, VitD 600IU/day  51-70yoa - Ca 1200mg/day, VitD 600IU/day  >71yoa - Ca 1200mg/day, VitD 800IU/day  STD testing: N/A  Contraception: N/A  Discussed the diagnosis of PCOS (meets criteria based on oligomenorrhea and clinical evidence of increased androgens). Check for diabetes and dyslipidemia - labs ordered. Discussed management with COCs v spironolactone for side effects of hyperandrogenism. Declined at this time. Counseled on need for a cycle at least every 3 months. If goes 3 months without menstrual cycle counseled to take Provera 10mg qDay x10 days to induce cycle. Discussed elevated risk of endometrial cancer with obesity and recommended weight loss. Given handout on information about PCOS. Questions answered.  Desires referral to derm for mole check    She was counseled to follow up with her PCP for other routine health maintenance

## 2024-02-23 ENCOUNTER — LAB VISIT (OUTPATIENT)
Dept: LAB | Facility: HOSPITAL | Age: 24
End: 2024-02-23
Attending: OBSTETRICS & GYNECOLOGY
Payer: MEDICAID

## 2024-02-23 DIAGNOSIS — Z00.00 HEALTH CARE MAINTENANCE: ICD-10-CM

## 2024-02-23 LAB
CHOLEST SERPL-MCNC: 155 MG/DL (ref 120–199)
CHOLEST/HDLC SERPL: 2.9 {RATIO} (ref 2–5)
ESTIMATED AVG GLUCOSE: 100 MG/DL (ref 68–131)
HBA1C MFR BLD: 5.1 % (ref 4–5.6)
HDLC SERPL-MCNC: 53 MG/DL (ref 40–75)
HDLC SERPL: 34.2 % (ref 20–50)
LDLC SERPL CALC-MCNC: 91.6 MG/DL (ref 63–159)
NONHDLC SERPL-MCNC: 102 MG/DL
TRIGL SERPL-MCNC: 52 MG/DL (ref 30–150)

## 2024-02-23 PROCEDURE — 36415 COLL VENOUS BLD VENIPUNCTURE: CPT | Performed by: OBSTETRICS & GYNECOLOGY

## 2024-02-23 PROCEDURE — 80061 LIPID PANEL: CPT | Performed by: OBSTETRICS & GYNECOLOGY

## 2024-02-23 PROCEDURE — 83036 HEMOGLOBIN GLYCOSYLATED A1C: CPT | Performed by: OBSTETRICS & GYNECOLOGY

## 2024-02-28 ENCOUNTER — HOSPITAL ENCOUNTER (OUTPATIENT)
Dept: RADIOLOGY | Facility: HOSPITAL | Age: 24
Discharge: HOME | End: 2024-02-28
Attending: STUDENT IN AN ORGANIZED HEALTH CARE EDUCATION/TRAINING PROGRAM
Payer: MEDICAID

## 2024-02-28 DIAGNOSIS — J34.1 MUCOCELE OF MAXILLARY SINUS: ICD-10-CM

## 2024-02-28 PROCEDURE — 70486 CT MAXILLOFACIAL W/O DYE: CPT | Mod: TC,PO

## 2024-03-01 ENCOUNTER — OFFICE VISIT (OUTPATIENT)
Dept: OTOLARYNGOLOGY | Facility: CLINIC | Age: 24
End: 2024-03-01
Payer: MEDICAID

## 2024-03-01 VITALS
HEART RATE: 99 BPM | SYSTOLIC BLOOD PRESSURE: 118 MMHG | BODY MASS INDEX: 47.09 KG/M2 | HEIGHT: 66 IN | DIASTOLIC BLOOD PRESSURE: 78 MMHG | WEIGHT: 293 LBS

## 2024-03-01 DIAGNOSIS — J34.2 NASAL SEPTAL DEVIATION: ICD-10-CM

## 2024-03-01 DIAGNOSIS — J34.3 HYPERTROPHY OF BOTH INFERIOR NASAL TURBINATES: ICD-10-CM

## 2024-03-01 DIAGNOSIS — J34.1 MUCOCELE OF MAXILLARY SINUS: Primary | ICD-10-CM

## 2024-03-01 PROCEDURE — 1159F MED LIST DOCD IN RCRD: CPT | Mod: CPTII,,, | Performed by: STUDENT IN AN ORGANIZED HEALTH CARE EDUCATION/TRAINING PROGRAM

## 2024-03-01 PROCEDURE — 99215 OFFICE O/P EST HI 40 MIN: CPT | Mod: S$PBB,,, | Performed by: STUDENT IN AN ORGANIZED HEALTH CARE EDUCATION/TRAINING PROGRAM

## 2024-03-01 PROCEDURE — 3008F BODY MASS INDEX DOCD: CPT | Mod: CPTII,,, | Performed by: STUDENT IN AN ORGANIZED HEALTH CARE EDUCATION/TRAINING PROGRAM

## 2024-03-01 PROCEDURE — 3078F DIAST BP <80 MM HG: CPT | Mod: CPTII,,, | Performed by: STUDENT IN AN ORGANIZED HEALTH CARE EDUCATION/TRAINING PROGRAM

## 2024-03-01 PROCEDURE — 1160F RVW MEDS BY RX/DR IN RCRD: CPT | Mod: CPTII,,, | Performed by: STUDENT IN AN ORGANIZED HEALTH CARE EDUCATION/TRAINING PROGRAM

## 2024-03-01 PROCEDURE — 3044F HG A1C LEVEL LT 7.0%: CPT | Mod: CPTII,,, | Performed by: STUDENT IN AN ORGANIZED HEALTH CARE EDUCATION/TRAINING PROGRAM

## 2024-03-01 PROCEDURE — 99214 OFFICE O/P EST MOD 30 MIN: CPT | Mod: PBBFAC | Performed by: STUDENT IN AN ORGANIZED HEALTH CARE EDUCATION/TRAINING PROGRAM

## 2024-03-01 PROCEDURE — 99999 PR PBB SHADOW E&M-EST. PATIENT-LVL IV: CPT | Mod: PBBFAC,,, | Performed by: STUDENT IN AN ORGANIZED HEALTH CARE EDUCATION/TRAINING PROGRAM

## 2024-03-01 PROCEDURE — 3074F SYST BP LT 130 MM HG: CPT | Mod: CPTII,,, | Performed by: STUDENT IN AN ORGANIZED HEALTH CARE EDUCATION/TRAINING PROGRAM

## 2024-03-04 NOTE — PROGRESS NOTES
"    Subjective:      Tawnya is a 23 y.o. female who comes for follow-up of sinusitis.  Her last visit with me was on 1/22/2024.  Has been feeling relatively good since our last visit. Has some occasional eye pressure, no edema or vision changes noted. Cultures at last visit were sterile. Had repeat CT scan.     Her current sinus regime consists of: Saline irrigations.     The assessment of quality and severity of symptoms as measured by the SNOT-22 score is deferred.    The patient's medications, allergies, past medical, surgical, social and family histories were reviewed and updated as appropriate.    A detailed review of systems was obtained with pertinent positives as per the above HPI, and otherwise negative.        Objective:     /78 (BP Location: Left arm, Patient Position: Sitting, BP Method: Large (Automatic))   Pulse 99   Ht 5' 6" (1.676 m)   Wt (!) 145.2 kg (320 lb 1.7 oz)   LMP 02/10/2024   BMI 51.67 kg/m²        Constitutional:   Vital signs are normal. She appears well-developed. Normal speech.      Head:  Normocephalic and atraumatic.     Ears:    Right Ear: No drainage or tenderness. No middle ear effusion.   Left Ear: No drainage or tenderness.  No middle ear effusion.     Mouth/Throat  Oropharynx clear and moist without lesions or asymmetry and normal uvula midline. No trismus. No oropharyngeal exudate. Mirror exam not performed due to patient tolerance.  Mirror exam not performed due to patient tolerance.      Neck:  Neck normal without thyromegaly masses, asymmetry, normal tracheal structure, crepitus, and tenderness, thyroid normal and trachea normal.     Pulmonary/Chest:   Effort normal.     Psychiatric:   She has a normal mood and affect. Her speech is normal.     Skin:   No abrasions, lacerations, lesions, or rashes.     Procedure    None    Data Reviewed    No results found for: "WBC"  No results found for: "EOSINOPHIL"  No results found for: "EOS"  No results found for: " ""PLT"  No results found for: "GLU"  No results found for: "IGE"    I independently reviewed the images of the CT sinuses dated 2/12/24. Pertinent findings include slight right septal deviation. Complete op[acification of left maxillary sinus with bulging of the uncinate process, completely obstructed left OMC. Patchy left ethmoid sinus opacification. Right retention cyst in maxillary sinus patent OMC.      Assessment:     1. Mucocele of maxillary sinus    2. Hypertrophy of both inferior nasal turbinates    3. Nasal septal deviation         Plan:     - She would benefit from left STESS for the treatment of her condition.  I discussed the risks, benefits and alternatives to surgery with the patient, as well as the expected postoperative course.  I gave her the opportunity to ask questions and I answered all of them.  I provided relevant printed information on her condition for her to review at home.  Same-day discharge is anticipated.  She may have anesthesia triage by telephone.   The surgery will be tentatively scheduled for 6/12/24.  She will return for a postoperative visit 1 week after surgery.  - The risks and benefits of endoscopic surgery were discussed with the patient in detail, which include but are not limited to pain, bleeding, infection, the need for reoperation, injury to orbit or orbital contents, injury to brain or meninges, and unforeseeable complications of surgery including myocardial infarction, stroke or pulmonary embolus.  - She would like CT scan done before surgery to ensure no significant changes.     Sampson Jiménez MD     "

## 2024-03-26 ENCOUNTER — TELEPHONE (OUTPATIENT)
Dept: BARIATRICS | Facility: CLINIC | Age: 24
End: 2024-03-26
Payer: MEDICAID

## 2024-03-26 ENCOUNTER — PATIENT MESSAGE (OUTPATIENT)
Dept: INTERNAL MEDICINE | Facility: CLINIC | Age: 24
End: 2024-03-26
Payer: MEDICAID

## 2024-03-26 ENCOUNTER — OFFICE VISIT (OUTPATIENT)
Dept: INTERNAL MEDICINE | Facility: CLINIC | Age: 24
End: 2024-03-26
Payer: MEDICAID

## 2024-03-26 VITALS
DIASTOLIC BLOOD PRESSURE: 73 MMHG | HEIGHT: 66 IN | OXYGEN SATURATION: 98 % | WEIGHT: 293 LBS | HEART RATE: 84 BPM | SYSTOLIC BLOOD PRESSURE: 123 MMHG | BODY MASS INDEX: 47.09 KG/M2

## 2024-03-26 DIAGNOSIS — Z80.8 FAMILY HISTORY OF THYROID CANCER: ICD-10-CM

## 2024-03-26 DIAGNOSIS — K76.0 FATTY LIVER: ICD-10-CM

## 2024-03-26 DIAGNOSIS — N64.4 BREAST PAIN, RIGHT: ICD-10-CM

## 2024-03-26 DIAGNOSIS — Z00.00 HEALTH CARE MAINTENANCE: Primary | ICD-10-CM

## 2024-03-26 PROCEDURE — 1159F MED LIST DOCD IN RCRD: CPT | Mod: CPTII,,, | Performed by: INTERNAL MEDICINE

## 2024-03-26 PROCEDURE — 99999 PR PBB SHADOW E&M-EST. PATIENT-LVL III: CPT | Mod: PBBFAC,,, | Performed by: INTERNAL MEDICINE

## 2024-03-26 PROCEDURE — 3044F HG A1C LEVEL LT 7.0%: CPT | Mod: CPTII,,, | Performed by: INTERNAL MEDICINE

## 2024-03-26 PROCEDURE — 3078F DIAST BP <80 MM HG: CPT | Mod: CPTII,,, | Performed by: INTERNAL MEDICINE

## 2024-03-26 PROCEDURE — 1160F RVW MEDS BY RX/DR IN RCRD: CPT | Mod: CPTII,,, | Performed by: INTERNAL MEDICINE

## 2024-03-26 PROCEDURE — 3008F BODY MASS INDEX DOCD: CPT | Mod: CPTII,,, | Performed by: INTERNAL MEDICINE

## 2024-03-26 PROCEDURE — 99395 PREV VISIT EST AGE 18-39: CPT | Mod: S$PBB,,, | Performed by: INTERNAL MEDICINE

## 2024-03-26 PROCEDURE — 3074F SYST BP LT 130 MM HG: CPT | Mod: CPTII,,, | Performed by: INTERNAL MEDICINE

## 2024-03-26 PROCEDURE — 99213 OFFICE O/P EST LOW 20 MIN: CPT | Mod: PBBFAC | Performed by: INTERNAL MEDICINE

## 2024-03-26 RX ORDER — BUDESONIDE 0.5 MG/2ML
INHALANT ORAL
COMMUNITY

## 2024-03-26 NOTE — PROGRESS NOTES
"Subjective:       Patient ID: Tawnya Sanz is a 23 y.o. female.    Chief Complaint: Establish Care and Annual Exam    HPI  Tawnya Sanz is a 23 y.o. female here to establish care and have yearly preventative healthcare visit.     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     Chronic sinusitis - sees ENT Dr. Jiménez and scheduled for surgery- FESS.     She has derm appt for skin check in [place] later this year.     Provera was prn, has not needed.     Has struggled witih weight sicne childhood.   Lowest adult weight was 240# in college. Steady 320# last several years.   [employer] member.    Left foot swelling after injury over a year ago that is consistent. Sometimes tight.    Right breast pain - waxes and wanes. Does completely resolve at points. Had gallbladder US which was normal except fatty liver.   Breast US was normal.     Right groin pain. Over a year.   Review of Systems   Constitutional:  Negative for fever.   Respiratory:  Negative for shortness of breath.    Cardiovascular:  Negative for chest pain.   Musculoskeletal: Negative.    Skin: Negative.        Objective:   /73 (BP Location: Right arm, Patient Position: Sitting, BP Method: Large (Manual))   Pulse 84   Ht 5' 6" (1.676 m)   Wt (!) 143.4 kg (316 lb 2.2 oz)   SpO2 98%   BMI 51.03 kg/m²      Physical Exam  Constitutional:       General: She is not in acute distress.     Appearance: She is well-developed. She is not diaphoretic.   HENT:      Head: Normocephalic and atraumatic.   Cardiovascular:      Rate and Rhythm: Normal rate and regular rhythm.   Pulmonary:      Effort: Pulmonary effort is normal. No respiratory distress.      Breath sounds: No wheezing or rales.   Skin:     General: Skin is warm and dry.   Neurological:      Mental Status: She is alert and oriented to person, place, and time.   Psychiatric:         Behavior: Behavior normal.         Assessment:       1. Health care maintenance    2. BMI " 50.0-59.9, adult    3. Breast pain, right    4. Fatty liver    5. Family history of thyroid cancer        Plan:       Health care maintenance  -     COMPREHENSIVE METABOLIC PANEL; Future; Expected date: 03/26/2024  -     TSH; Future; Expected date: 03/26/2024    BMI 50.0-59.9, adult  -     Ambulatory referral/consult to Bariatric/Obesity Medicine; Future; Expected date: 04/02/2024 (not covered by insurance)  Discussed diet, exercise  Discussed potential pharmacologic aides    Breast pain, right  Question referred RUQ pain  Breast US and gallbladder US unrevealing  CBE per gyn wnl    Fatty liver  Check CMP    Family history of thyroid cancer  -     US Soft Tissue Head Neck; Future; Expected date: 03/26/2024          You are up to date for your primary preventive health care, and there are no reminders at this time.     Bariatric med  Covid booster  Discussed metformin for probably PCOS, she will consider and let me know if she wants to start this.

## 2024-03-28 ENCOUNTER — LAB VISIT (OUTPATIENT)
Dept: LAB | Facility: HOSPITAL | Age: 24
End: 2024-03-28
Payer: MEDICAID

## 2024-03-28 DIAGNOSIS — Z00.00 HEALTH CARE MAINTENANCE: ICD-10-CM

## 2024-03-28 LAB
ALBUMIN SERPL BCP-MCNC: 4 G/DL (ref 3.5–5.2)
ALP SERPL-CCNC: 68 U/L (ref 55–135)
ALT SERPL W/O P-5'-P-CCNC: 24 U/L (ref 10–44)
ANION GAP SERPL CALC-SCNC: 7 MMOL/L (ref 8–16)
AST SERPL-CCNC: 16 U/L (ref 10–40)
BILIRUB SERPL-MCNC: 0.3 MG/DL (ref 0.1–1)
BUN SERPL-MCNC: 11 MG/DL (ref 6–20)
CALCIUM SERPL-MCNC: 10.1 MG/DL (ref 8.7–10.5)
CHLORIDE SERPL-SCNC: 108 MMOL/L (ref 95–110)
CO2 SERPL-SCNC: 26 MMOL/L (ref 23–29)
CREAT SERPL-MCNC: 0.9 MG/DL (ref 0.5–1.4)
EST. GFR  (NO RACE VARIABLE): >60 ML/MIN/1.73 M^2
GLUCOSE SERPL-MCNC: 83 MG/DL (ref 70–110)
POTASSIUM SERPL-SCNC: 4.2 MMOL/L (ref 3.5–5.1)
PROT SERPL-MCNC: 7.2 G/DL (ref 6–8.4)
SODIUM SERPL-SCNC: 141 MMOL/L (ref 136–145)
TSH SERPL DL<=0.005 MIU/L-ACNC: 1.19 UIU/ML (ref 0.4–4)

## 2024-03-28 PROCEDURE — 36415 COLL VENOUS BLD VENIPUNCTURE: CPT | Performed by: INTERNAL MEDICINE

## 2024-03-28 PROCEDURE — 80053 COMPREHEN METABOLIC PANEL: CPT | Performed by: INTERNAL MEDICINE

## 2024-03-28 PROCEDURE — 84443 ASSAY THYROID STIM HORMONE: CPT | Performed by: INTERNAL MEDICINE

## 2024-04-02 ENCOUNTER — HOSPITAL ENCOUNTER (OUTPATIENT)
Dept: RADIOLOGY | Facility: HOSPITAL | Age: 24
Discharge: HOME | End: 2024-04-02
Attending: INTERNAL MEDICINE
Payer: MEDICAID

## 2024-04-02 DIAGNOSIS — Z80.8 FAMILY HISTORY OF THYROID CANCER: ICD-10-CM

## 2024-04-02 PROCEDURE — 76536 US EXAM OF HEAD AND NECK: CPT | Mod: 26,,, | Performed by: RADIOLOGY

## 2024-04-02 PROCEDURE — 76536 US EXAM OF HEAD AND NECK: CPT | Mod: TC

## 2024-04-03 ENCOUNTER — OFFICE VISIT (OUTPATIENT)
Dept: INTERNAL MEDICINE | Facility: CLINIC | Age: 24
End: 2024-04-03
Payer: MEDICAID

## 2024-04-03 ENCOUNTER — PATIENT MESSAGE (OUTPATIENT)
Dept: INTERNAL MEDICINE | Facility: CLINIC | Age: 24
End: 2024-04-03
Payer: MEDICAID

## 2024-04-03 VITALS — WEIGHT: 293 LBS | HEIGHT: 66 IN | BODY MASS INDEX: 47.09 KG/M2

## 2024-04-03 DIAGNOSIS — T14.8XXA ABRASION: Primary | ICD-10-CM

## 2024-04-03 PROCEDURE — 1160F RVW MEDS BY RX/DR IN RCRD: CPT | Mod: CPTII,,, | Performed by: INTERNAL MEDICINE

## 2024-04-03 PROCEDURE — 3008F BODY MASS INDEX DOCD: CPT | Mod: CPTII,,, | Performed by: INTERNAL MEDICINE

## 2024-04-03 PROCEDURE — 1159F MED LIST DOCD IN RCRD: CPT | Mod: CPTII,,, | Performed by: INTERNAL MEDICINE

## 2024-04-03 PROCEDURE — 3044F HG A1C LEVEL LT 7.0%: CPT | Mod: CPTII,,, | Performed by: INTERNAL MEDICINE

## 2024-04-03 PROCEDURE — 99999 PR PBB SHADOW E&M-EST. PATIENT-LVL II: CPT | Mod: PBBFAC,,, | Performed by: INTERNAL MEDICINE

## 2024-04-03 PROCEDURE — 99212 OFFICE O/P EST SF 10 MIN: CPT | Mod: PBBFAC | Performed by: INTERNAL MEDICINE

## 2024-04-03 PROCEDURE — 99213 OFFICE O/P EST LOW 20 MIN: CPT | Mod: S$PBB,,, | Performed by: INTERNAL MEDICINE

## 2024-04-03 NOTE — PROGRESS NOTES
"Subjective:       Patient ID: Tawnya Sanz is a 23 y.o. female.    Chief Complaint: cut on hand    HPI  23 y.o. female here for evaluation of injury to her hand. She is a medical student on surgical rotation. She just noticed a small injury on her hand and became concerned it may have been obtained during a procedure and wants to discuss need for PEP.   She does not have any recalled injury and her resident did not observe any injury. She did not note broken gloves.   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   Review of Systems   Constitutional:  Negative for fever.   Respiratory:  Negative for shortness of breath.    Cardiovascular:  Negative for chest pain.   Musculoskeletal: Negative.    Skin: Negative.        Objective:   Ht 5' 6" (1.676 m)   Wt (!) 144.5 kg (318 lb 9 oz)   BMI 51.42 kg/m²      Physical Exam  Constitutional:       General: She is not in acute distress.     Appearance: She is well-developed. She is not diaphoretic.   HENT:      Head: Normocephalic and atraumatic.   Cardiovascular:      Rate and Rhythm: Normal rate.   Pulmonary:      Effort: Pulmonary effort is normal. No respiratory distress.   Skin:     General: Skin is warm and dry.      Comments: 2mm hyperpigmented abrasion on palm of hand, no laceration   Neurological:      Mental Status: She is alert and oriented to person, place, and time.   Psychiatric:         Behavior: Behavior normal.         Assessment:       1. Abrasion        Plan:       Abrasion  Very small injury that was most likely obtained outside of patient care. Recent patients also very low risk. Discussed with patient that her risk of needle stick injury is exceedingly small. She declines PEP at this time.          You are up to date for your primary preventive health care, and there are no reminders at this time.     "

## 2024-04-15 ENCOUNTER — PATIENT MESSAGE (OUTPATIENT)
Dept: OTOLARYNGOLOGY | Facility: CLINIC | Age: 24
End: 2024-04-15
Payer: MEDICAID

## 2024-04-17 ENCOUNTER — PATIENT MESSAGE (OUTPATIENT)
Dept: INTERNAL MEDICINE | Facility: CLINIC | Age: 24
End: 2024-04-17
Payer: MEDICAID

## 2024-05-21 ENCOUNTER — HOSPITAL ENCOUNTER (OUTPATIENT)
Dept: RADIOLOGY | Facility: HOSPITAL | Age: 24
Discharge: HOME | End: 2024-05-21
Attending: STUDENT IN AN ORGANIZED HEALTH CARE EDUCATION/TRAINING PROGRAM
Payer: MEDICAID

## 2024-05-21 DIAGNOSIS — J34.1 MUCOCELE OF MAXILLARY SINUS: ICD-10-CM

## 2024-05-21 PROCEDURE — 70486 CT MAXILLOFACIAL W/O DYE: CPT | Mod: TC

## 2024-05-21 PROCEDURE — 70486 CT MAXILLOFACIAL W/O DYE: CPT | Mod: 26,,, | Performed by: RADIOLOGY

## 2024-05-22 ENCOUNTER — PATIENT MESSAGE (OUTPATIENT)
Dept: OTOLARYNGOLOGY | Facility: CLINIC | Age: 24
End: 2024-05-22
Payer: MEDICAID

## 2024-05-28 ENCOUNTER — PATIENT MESSAGE (OUTPATIENT)
Dept: OTOLARYNGOLOGY | Facility: CLINIC | Age: 24
End: 2024-05-28
Payer: MEDICAID

## 2024-06-03 ENCOUNTER — TELEPHONE (OUTPATIENT)
Dept: OTOLARYNGOLOGY | Facility: HOSPITAL | Age: 24
End: 2024-06-03
Payer: MEDICAID

## 2024-06-10 ENCOUNTER — PATIENT MESSAGE (OUTPATIENT)
Dept: INTERNAL MEDICINE | Facility: CLINIC | Age: 24
End: 2024-06-10
Payer: MEDICAID

## 2024-06-18 ENCOUNTER — PATIENT MESSAGE (OUTPATIENT)
Dept: INTERNAL MEDICINE | Facility: CLINIC | Age: 24
End: 2024-06-18
Payer: MEDICAID

## 2024-06-18 NOTE — TELEPHONE ENCOUNTER
Pt is suggesting she has a yeast infection, states she just finished antibiotics. Pt asking what type of antifungal to get

## 2024-07-01 ENCOUNTER — PATIENT MESSAGE (OUTPATIENT)
Dept: INTERNAL MEDICINE | Facility: CLINIC | Age: 24
End: 2024-07-01
Payer: MEDICAID

## 2024-07-02 ENCOUNTER — E-VISIT (OUTPATIENT)
Dept: INTERNAL MEDICINE | Facility: CLINIC | Age: 24
End: 2024-07-02
Payer: MEDICAID

## 2024-07-02 DIAGNOSIS — J01.90 ACUTE RHINOSINUSITIS: Primary | ICD-10-CM

## 2024-07-02 DIAGNOSIS — J34.89 SINUS PAIN: ICD-10-CM

## 2024-07-02 NOTE — PROGRESS NOTES
Patient ID: Tawnya Sanz is a 23 y.o. female.    Chief Complaint: URI (Entered automatically based on patient selection in Patient Portal.)    The patient initiated a request through [employer] on 2024 for evaluation and management with a chief complaint of URI (Entered automatically based on patient selection in Patient Portal.)     I evaluated the questionnaire submission on 24.    [facility] E-Visit Covid    2024 12:56 PM CDT - Filed by Patient   Do you agree to participate in an E-Visit? Yes   If you have any of the following symptoms, go to your local emergency room or call 911: I acknowledge   Are you pregnant, could you be pregnant, or are you breast feeding? None of the above   What is the main issue you would like addressed today? Sinus pain   Do you think you might have COVID or the Flu? No   Have you tested positive for COVID or Flu? No   What symptoms do you currently have?  Nasal Congestion   Describe the mucus: Green   Have you ever smoked? I have never smoked   Have you had a fever? No   When did your symptoms first appear? 2024   In the last two weeks, have you been in close contact with someone who has COVID-19 or the Flu? No   List what you have done or taken to help your symptoms. Sinus rinse   How severe are your symptoms? Moderate   Have your symptoms gotten better or worse since they started?  No change   Do you have transportation to get testing if it is needed and ordered for you at an [facility] location? Yes   Provide any additional information you feel is important. n/a   Please attach any relevant images or files    Are you able to take your vital signs? No         Encounter Diagnosis   Name Primary?    Acute rhinosinusitis Yes      Supportive care      No orders of the defined types were placed in this encounter.           No follow-ups on file.      E-Visit Time Trackin minutes

## 2024-08-15 ENCOUNTER — PATIENT MESSAGE (OUTPATIENT)
Dept: INTERNAL MEDICINE | Facility: CLINIC | Age: 24
End: 2024-08-15
Payer: MEDICAID

## 2024-08-15 ENCOUNTER — OFFICE VISIT (OUTPATIENT)
Dept: INTERNAL MEDICINE | Facility: CLINIC | Age: 24
End: 2024-08-15
Payer: MEDICAID

## 2024-08-15 VITALS
WEIGHT: 293 LBS | BODY MASS INDEX: 47.09 KG/M2 | DIASTOLIC BLOOD PRESSURE: 60 MMHG | HEART RATE: 75 BPM | OXYGEN SATURATION: 97 % | SYSTOLIC BLOOD PRESSURE: 124 MMHG | HEIGHT: 66 IN

## 2024-08-15 DIAGNOSIS — L03.116 CELLULITIS OF LEFT LOWER EXTREMITY: ICD-10-CM

## 2024-08-15 DIAGNOSIS — B35.3 TINEA PEDIS OF BOTH FEET: ICD-10-CM

## 2024-08-15 DIAGNOSIS — L73.9 FOLLICULITIS: Primary | ICD-10-CM

## 2024-08-15 PROCEDURE — 99999 PR PBB SHADOW E&M-EST. PATIENT-LVL III: CPT | Mod: PBBFAC,,,

## 2024-08-15 PROCEDURE — 99213 OFFICE O/P EST LOW 20 MIN: CPT | Mod: PBBFAC

## 2024-08-15 RX ORDER — CEPHALEXIN 500 MG/1
500 CAPSULE ORAL EVERY 8 HOURS
Qty: 30 CAPSULE | Refills: 0 | Status: SHIPPED | OUTPATIENT
Start: 2024-08-15 | End: 2024-08-25

## 2024-08-15 RX ORDER — PRENATAL VIT 91/IRON/FOLIC/DHA 28-975-200
COMBINATION PACKAGE (EA) ORAL 2 TIMES DAILY
Qty: 28.4 G | Refills: 0 | Status: SHIPPED | OUTPATIENT
Start: 2024-08-15 | End: 2024-09-14

## 2024-08-15 NOTE — ASSESSMENT & PLAN NOTE
-also has active tinea pedis to this foot  -prescribed topical terbinafine cream and oral abx for secondary cellulitis  -discussed possibility of needing oral antifungal if no improvement  -please reach out within 48 hours if no improvement

## 2024-08-15 NOTE — PROGRESS NOTES
"INTERNAL MEDICINE  [facility]    Reason for visit:   Chief Complaint   Patient presents with    Rash     Left foot       HPI: Tawnya Sanz is a 23 y.o. female presenting today for rash.    Patient is an established patient of PCP, Dr. Jennifer Rao MD. This patient is new to me.    She presents with blistered rash to the right lateral lower leg with surrounding erythema. She believes it developed as a result of an ingrown hair. It has been present and worsening for over one week. She has also been treating tinea pedis of bilateral feet with ketoconazole 2% cream for one week- Prescribed by derm. She now has increased swelling and redness to left foot and toes, especially her second toe. She states it feels "tight and is difficult to move." Denies fever, chills, tachycardia, weakness.     She has a history of fatty liver disease and would rather not trial oral antifungals at this time. Last CMP in 3/2024 was wnl. Will switch topical treatment to Terbinafine HCL 1% cream and treat folliculitis/cellulitis with Keflex 500 mg Q8 hr x 10 days.    RISK FACTORS: H/o blistering sunburns and Family h/o melanoma. Denies Personal h/o melanoma  PERSONAL HISTORY SKIN CANCER: No  FAMILY HISTORY SKIN CANCER: yes, Melanoma in situ in father, melanoma in grandmother  LAST SKIN EXAM: 8/2024     Will follow-up within the week to assess effectiveness.       Review of Systems   Constitutional:  Negative for chills, diaphoresis, fever and malaise/fatigue.   Musculoskeletal:  Negative for myalgias.   Skin:  Positive for rash. Negative for itching.   Neurological:  Negative for tingling and weakness.   All other systems reviewed and are negative.      Social History     Social History Narrative    Medical student at        ALLERGIES:   Review of patient's allergies indicates:  No Known Allergies    MEDS:   Current Outpatient Medications on File Prior to Visit   Medication Sig Dispense Refill Last Dose " "   budesonide (PULMICORT) 0.5 mg/2 mL nebulizer solution Take by nebulization.   Taking    budesonide (PULMICORT) 0.5 mg/2 mL nebulizer solution Use one vial via nebulizer twice daily 120 mL 11 Taking    BUDESONIDE 0.6 MG/NORMAL SALINE 50 ML NASAL IRRIGATION Patient to irrigate each nostril with 25ml twice daily.   Taking    ciclopirox (PENLAC) 8 % Soln Apply topically nightly over nail and surrounding skin over previous coat. After seven (7) days, may remove with alcohol and continue cycle. 6.6 mL 3 Taking    clindamycin phosphate (CLINDAGEL) 1 % glqd Apply 1 Application topically daily 75 mL 3 Taking    medroxyPROGESTERone (PROVERA) 10 MG tablet Take 1 tablet (10 mg total) by mouth once daily. 10 tablet 0 Taking    mupirocin (BACTROBAN) 2 % ointment Apply topically 3 (three) times daily for 7 days 22 g 0 Taking    mupirocin (BACTROBAN) 2 % ointment Apply topically 2 (two) times daily Inside nostrils 22 g 0 Taking    [DISCONTINUED] ketoconazole (NIZORAL) 2 % cream Apply twice a day for 2 to 3 weeks until scaling has resolved. After resolved, continue treating for additional week. 30 g 3 Taking       Vital signs:   Vitals:    08/15/24 0927   BP: 124/60   BP Location: Left arm   Patient Position: Sitting   Pulse: 75   SpO2: 97%   Weight: (!) 144.8 kg (319 lb 3.6 oz)   Height: 5' 6" (1.676 m)     Body mass index is 51.52 kg/m².    PHYSICAL EXAM:     Physical Exam  Vitals and nursing note reviewed.   Constitutional:       Appearance: Normal appearance. She is not ill-appearing or diaphoretic.   HENT:      Head: Normocephalic and atraumatic.   Eyes:      Extraocular Movements: Extraocular movements intact.      Pupils: Pupils are equal, round, and reactive to light.   Cardiovascular:      Rate and Rhythm: Normal rate.   Pulmonary:      Effort: Pulmonary effort is normal.   Musculoskeletal:         General: Swelling present.      Cervical back: Normal range of motion.      Right foot: Normal.      Left foot: Normal range " of motion and normal capillary refill. Swelling present. Normal pulse.   Skin:     General: Skin is warm and dry.      Capillary Refill: Capillary refill takes less than 2 seconds.      Coloration: Skin is not pale.      Findings: Erythema (surrounding blister of right lateral lower leg and on left toes/foot) and rash (bilateral tinea pedis) present.   Neurological:      Mental Status: She is alert and oriented to person, place, and time.      Gait: Gait normal.   Psychiatric:         Mood and Affect: Mood normal.         Behavior: Behavior normal.         Thought Content: Thought content normal.           PERTINENT RESULTS:   No visits with results within 1 Week(s) from this visit.   Latest known visit with results is:   Lab Visit on 03/28/2024   Component Date Value Ref Range Status    Sodium 03/28/2024 141  136 - 145 mmol/L Final    Potassium 03/28/2024 4.2  3.5 - 5.1 mmol/L Final    Chloride 03/28/2024 108  95 - 110 mmol/L Final    CO2 03/28/2024 26  23 - 29 mmol/L Final    Glucose 03/28/2024 83  70 - 110 mg/dL Final    BUN 03/28/2024 11  6 - 20 mg/dL Final    Creatinine 03/28/2024 0.9  0.5 - 1.4 mg/dL Final    Calcium 03/28/2024 10.1  8.7 - 10.5 mg/dL Final    Total Protein 03/28/2024 7.2  6.0 - 8.4 g/dL Final    Albumin 03/28/2024 4.0  3.5 - 5.2 g/dL Final    Total Bilirubin 03/28/2024 0.3  0.1 - 1.0 mg/dL Final    Comment: For infants and newborns, interpretation of results should be based  on gestational age, weight and in agreement with clinical  observations.    Premature Infant recommended reference ranges:  Up to 24 hours.............<8.0 mg/dL  Up to 48 hours............<12.0 mg/dL  3-5 days..................<15.0 mg/dL  6-29 days.................<15.0 mg/dL      Alkaline Phosphatase 03/28/2024 68  55 - 135 U/L Final    AST 03/28/2024 16  10 - 40 U/L Final    ALT 03/28/2024 24  10 - 44 U/L Final    eGFR 03/28/2024 >60.0  >60 mL/min/1.73 m^2 Final    Anion Gap 03/28/2024 7 (L)  8 - 16 mmol/L Final    TSH  03/28/2024 1.191  0.400 - 4.000 uIU/mL Final       ASSESSMENT/PLAN:    1. Folliculitis  -     cephALEXin (KEFLEX) 500 MG capsule; Take 1 capsule (500 mg total) by mouth every 8 (eight) hours. for 10 days  Dispense: 30 capsule; Refill: 0    2. Cellulitis of left lower extremity  Assessment & Plan:  -also has active tinea pedis to this foot  -prescribed topical terbinafine cream and oral abx for secondary cellulitis  -discussed possibility of needing oral antifungal if no improvement  -please reach out within 48 hours if no improvement    Orders:  -     cephALEXin (KEFLEX) 500 MG capsule; Take 1 capsule (500 mg total) by mouth every 8 (eight) hours. for 10 days  Dispense: 30 capsule; Refill: 0    3. Tinea pedis of both feet  -     terbinafine HCL (LAMISIL) 1 % cream; Apply topically 2 (two) times daily.  Dispense: 28.4 g; Refill: 0    Health maintenance addressed: Due for pap- will refer to GYN  Vaccines recommended: Covid-19 booster    Follow up in about 5 days (around 8/20/2024) for follow-up.     GILBERTO Parks   Internal Medicine

## 2024-08-19 ENCOUNTER — PATIENT MESSAGE (OUTPATIENT)
Dept: INTERNAL MEDICINE | Facility: CLINIC | Age: 24
End: 2024-08-19
Payer: MEDICAID

## 2024-08-22 ENCOUNTER — TELEPHONE (OUTPATIENT)
Dept: INTERNAL MEDICINE | Facility: CLINIC | Age: 24
End: 2024-08-22
Payer: MEDICAID

## 2024-08-22 NOTE — TELEPHONE ENCOUNTER
Patient has been contacted in regards to appointment reschedule. Patient has requested appointment set on today 8/22/2024 at 3pm be rescheduled until tomorrow if provider has availability. NP Bobo does have openings on tomorrow. Appointment request must be sent to overbook staff for scheduling.   Patient voices understanding, NP will be notified.

## 2024-08-22 NOTE — TELEPHONE ENCOUNTER
----- Message from Shruthi Rajput sent at 8/22/2024  8:09 AM CDT -----  Regarding: reschedule  Name of caller: Shruthi       What is the requesting detail: pt is requesting to reschedule her appt tomorrow. States anytime tomorrow works for her. Please advise       Can the clinic reply by [facility]: yes       What number to call back: [phone]

## 2024-08-23 ENCOUNTER — OFFICE VISIT (OUTPATIENT)
Dept: INTERNAL MEDICINE | Facility: CLINIC | Age: 24
End: 2024-08-23
Payer: MEDICAID

## 2024-08-23 VITALS
DIASTOLIC BLOOD PRESSURE: 84 MMHG | HEART RATE: 60 BPM | BODY MASS INDEX: 47.09 KG/M2 | HEIGHT: 66 IN | WEIGHT: 293 LBS | SYSTOLIC BLOOD PRESSURE: 120 MMHG | OXYGEN SATURATION: 98 %

## 2024-08-23 DIAGNOSIS — L73.9 FOLLICULITIS: ICD-10-CM

## 2024-08-23 DIAGNOSIS — R60.0 EDEMA, PERIPHERAL: ICD-10-CM

## 2024-08-23 DIAGNOSIS — L03.116 CELLULITIS OF LEFT LOWER EXTREMITY: Primary | ICD-10-CM

## 2024-08-23 DIAGNOSIS — B35.3 TINEA PEDIS OF BOTH FEET: ICD-10-CM

## 2024-08-23 PROCEDURE — 99999 PR PBB SHADOW E&M-EST. PATIENT-LVL IV: CPT | Mod: PBBFAC,,,

## 2024-08-23 PROCEDURE — 99214 OFFICE O/P EST MOD 30 MIN: CPT | Mod: PBBFAC

## 2024-08-23 RX ORDER — MUPIROCIN 20 MG/G
OINTMENT TOPICAL 3 TIMES DAILY
Qty: 22 G | Refills: 0 | Status: SHIPPED | OUTPATIENT
Start: 2024-08-23 | End: 2024-09-14

## 2024-08-23 NOTE — PROGRESS NOTES
INTERNAL MEDICINE  [facility]    Reason for visit:   Chief Complaint   Patient presents with    Rash     F/u 1w       HPI: Tawnya Sanz is a 23 y.o. female presenting today for folliculitis, cellulitis, and tinea pedis.    Patient is an established patient of PCP, Dr. Jennifer Rao MD. This patient is known to me.    Area of folliculitis/cellulitis to right lateral lower log has improved. Previously fluid-filled blister now appears scabbed, erythema and swelling have reduced. She reports a new area of folliculitis on her the anterior surface of the right second toe which is also improving. Bilateral tinea pedis also appears improved with reduction in swelling/erythema to the left foot and toes. She does continue to have left lower extremity peripheral edema which she reports has been present for years. She previously completed vascular imaging to the leg which did not identify an underlying cause (unable to access records at this time). She is interested in seeing specialist for this.    -She would prefer to avoid oral antifungal for concern of hx of fatty liver- last CMP was WNL if it does become necessary  -Will continue with topical terbinafine for bilateral tinea pedis until there is further resolution  -Will complete oral antibiotics for folliculitis/cellulitis, much improved  -Will apply topical mupirocin TID to areas of folliculitis/cellulitis for the next 7 days                    Review of Systems   Constitutional:  Negative for chills, fever and malaise/fatigue.   Cardiovascular:  Positive for leg swelling (left peripheral).   Skin:  Positive for itching and rash.   Neurological:  Negative for tingling, sensory change and weakness.   Endo/Heme/Allergies:  Does not bruise/bleed easily.   All other systems reviewed and are negative.      Social History     Social History Narrative    Medical student at        ALLERGIES:   Review of patient's allergies indicates:  No Known  "Allergies    MEDS:   Current Outpatient Medications on File Prior to Visit   Medication Sig Dispense Refill Last Dose    cephALEXin (KEFLEX) 500 MG capsule Take 1 capsule (500 mg total) by mouth every 8 (eight) hours. for 10 days 30 capsule 0 Taking    ciclopirox (PENLAC) 8 % Soln Apply topically nightly over nail and surrounding skin over previous coat. After seven (7) days, may remove with alcohol and continue cycle. 6.6 mL 3 Taking    clindamycin phosphate (CLINDAGEL) 1 % glqd Apply 1 Application topically daily 75 mL 3 Taking    medroxyPROGESTERone (PROVERA) 10 MG tablet Take 1 tablet (10 mg total) by mouth once daily. 10 tablet 0 Taking    propylene glycoL 0.6 % Drop Apply to eye.   Taking    terbinafine HCL (LAMISIL) 1 % cream Apply topically 2 (two) times daily. 28.4 g 0 Taking    [DISCONTINUED] budesonide (PULMICORT) 0.5 mg/2 mL nebulizer solution Take by nebulization. (Patient not taking: Reported on 8/23/2024)   Not Taking    [DISCONTINUED] budesonide (PULMICORT) 0.5 mg/2 mL nebulizer solution Use one vial via nebulizer twice daily (Patient not taking: Reported on 8/23/2024) 120 mL 11 Not Taking    [DISCONTINUED] BUDESONIDE 0.6 MG/NORMAL SALINE 50 ML NASAL IRRIGATION Patient to irrigate each nostril with 25ml twice daily. (Patient not taking: Patient to irrigate each nostril with 25ml twice daily. Reported on 8/23/2024)   Not Taking    [DISCONTINUED] mupirocin (BACTROBAN) 2 % ointment Apply topically 3 (three) times daily for 7 days (Patient not taking: Reported on 8/23/2024) 22 g 0 Not Taking    [DISCONTINUED] mupirocin (BACTROBAN) 2 % ointment Apply topically 2 (two) times daily Inside nostrils (Patient not taking: Reported on 8/23/2024) 22 g 0 Not Taking       Vital signs:   Vitals:    08/23/24 1057   BP: 120/84   BP Location: Left arm   Patient Position: Sitting   BP Method: Large (Manual)   Pulse: 60   SpO2: 98%   Weight: (!) 142.4 kg (313 lb 15 oz)   Height: 5' 6" (1.676 m)     Body mass index is " 50.67 kg/m².    PHYSICAL EXAM:     Physical Exam  Constitutional:       Appearance: Normal appearance. She is not ill-appearing or diaphoretic.   HENT:      Head: Normocephalic and atraumatic.   Eyes:      Extraocular Movements: Extraocular movements intact.      Pupils: Pupils are equal, round, and reactive to light.   Cardiovascular:      Rate and Rhythm: Normal rate.      Pulses:           Dorsalis pedis pulses are 2+ on the right side and 2+ on the left side.        Posterior tibial pulses are 2+ on the right side and 2+ on the left side.   Pulmonary:      Effort: Pulmonary effort is normal.   Musculoskeletal:      Cervical back: Normal range of motion.      Left lower le+ Edema present.        Feet:    Feet:      Right foot:      Toenail Condition: Fungal disease present.     Comments: Fungal presence to right great toenail - does not desire treatment  Skin:     General: Skin is warm and dry.      Coloration: Skin is not pale.      Findings: Erythema, lesion and rash present.             Comments: Marked area of improving folliculitis   Neurological:      Mental Status: She is alert and oriented to person, place, and time.      Motor: No weakness.      Gait: Gait normal.   Psychiatric:         Mood and Affect: Mood normal.         Behavior: Behavior normal.         Thought Content: Thought content normal.           PERTINENT RESULTS:   No visits with results within 1 Week(s) from this visit.   Latest known visit with results is:   Lab Visit on 2024   Component Date Value Ref Range Status    Sodium 2024 141  136 - 145 mmol/L Final    Potassium 2024 4.2  3.5 - 5.1 mmol/L Final    Chloride 2024 108  95 - 110 mmol/L Final    CO2 2024 26  23 - 29 mmol/L Final    Glucose 2024 83  70 - 110 mg/dL Final    BUN 2024 11  6 - 20 mg/dL Final    Creatinine 2024 0.9  0.5 - 1.4 mg/dL Final    Calcium 2024 10.1  8.7 - 10.5 mg/dL Final    Total Protein 2024 7.2  6.0  - 8.4 g/dL Final    Albumin 03/28/2024 4.0  3.5 - 5.2 g/dL Final    Total Bilirubin 03/28/2024 0.3  0.1 - 1.0 mg/dL Final    Comment: For infants and newborns, interpretation of results should be based  on gestational age, weight and in agreement with clinical  observations.    Premature Infant recommended reference ranges:  Up to 24 hours.............<8.0 mg/dL  Up to 48 hours............<12.0 mg/dL  3-5 days..................<15.0 mg/dL  6-29 days.................<15.0 mg/dL      Alkaline Phosphatase 03/28/2024 68  55 - 135 U/L Final    AST 03/28/2024 16  10 - 40 U/L Final    ALT 03/28/2024 24  10 - 44 U/L Final    eGFR 03/28/2024 >60.0  >60 mL/min/1.73 m^2 Final    Anion Gap 03/28/2024 7 (L)  8 - 16 mmol/L Final    TSH 03/28/2024 1.191  0.400 - 4.000 uIU/mL Final       ASSESSMENT/PLAN:    1. Cellulitis of left lower extremity  -     mupirocin (BACTROBAN) 2 % ointment; Apply topically 3 (three) times daily for 7 days  Dispense: 22 g; Refill: 0  -     Ambulatory referral/consult to Vascular Medicine; Future; Expected date: 08/30/2024    2. Folliculitis  -     mupirocin (BACTROBAN) 2 % ointment; Apply topically 3 (three) times daily for 7 days  Dispense: 22 g; Refill: 0    3. Tinea pedis of both feet  Assessment & Plan:  -continue with topical terbinafine twice daily      4. Edema, peripheral  -     Ambulatory referral/consult to Vascular Medicine; Future; Expected date: 08/30/2024        Vaccines recommended: Covid-19 booster    Follow up if symptoms worsen or fail to improve.     SATHISH Parks-C   Internal Medicine

## 2024-09-09 ENCOUNTER — OFFICE VISIT (OUTPATIENT)
Dept: OTOLARYNGOLOGY | Facility: CLINIC | Age: 24
End: 2024-09-09
Payer: MEDICAID

## 2024-09-09 VITALS
DIASTOLIC BLOOD PRESSURE: 72 MMHG | SYSTOLIC BLOOD PRESSURE: 108 MMHG | HEART RATE: 99 BPM | BODY MASS INDEX: 50.92 KG/M2 | WEIGHT: 293 LBS

## 2024-09-09 DIAGNOSIS — R60.9 MUCOSAL EDEMA: Primary | ICD-10-CM

## 2024-09-09 DIAGNOSIS — H57.12 PAIN IN PERIORBITAL REGION OF LEFT EYE: ICD-10-CM

## 2024-09-09 DIAGNOSIS — Z98.890 S/P SINUS SURGERY: ICD-10-CM

## 2024-09-09 DIAGNOSIS — J34.89 SINUS PAIN: ICD-10-CM

## 2024-09-09 PROCEDURE — 3044F HG A1C LEVEL LT 7.0%: CPT | Mod: CPTII,,, | Performed by: OTOLARYNGOLOGY

## 2024-09-09 PROCEDURE — 1159F MED LIST DOCD IN RCRD: CPT | Mod: CPTII,,, | Performed by: OTOLARYNGOLOGY

## 2024-09-09 PROCEDURE — 3074F SYST BP LT 130 MM HG: CPT | Mod: CPTII,,, | Performed by: OTOLARYNGOLOGY

## 2024-09-09 PROCEDURE — 99214 OFFICE O/P EST MOD 30 MIN: CPT | Mod: 25,S$PBB,, | Performed by: OTOLARYNGOLOGY

## 2024-09-09 PROCEDURE — 3078F DIAST BP <80 MM HG: CPT | Mod: CPTII,,, | Performed by: OTOLARYNGOLOGY

## 2024-09-09 PROCEDURE — 31231 NASAL ENDOSCOPY DX: CPT | Mod: S$PBB,,, | Performed by: OTOLARYNGOLOGY

## 2024-09-09 PROCEDURE — 99999 PR PBB SHADOW E&M-EST. PATIENT-LVL III: CPT | Mod: PBBFAC,,, | Performed by: OTOLARYNGOLOGY

## 2024-09-09 PROCEDURE — 3008F BODY MASS INDEX DOCD: CPT | Mod: CPTII,,, | Performed by: OTOLARYNGOLOGY

## 2024-09-09 PROCEDURE — 1160F RVW MEDS BY RX/DR IN RCRD: CPT | Mod: CPTII,,, | Performed by: OTOLARYNGOLOGY

## 2024-09-09 PROCEDURE — 99213 OFFICE O/P EST LOW 20 MIN: CPT | Mod: PBBFAC | Performed by: OTOLARYNGOLOGY

## 2024-09-09 PROCEDURE — 31231 NASAL ENDOSCOPY DX: CPT | Mod: PBBFAC | Performed by: OTOLARYNGOLOGY

## 2024-09-09 NOTE — PROGRESS NOTES
History of Present Illness:   Tawnya Sanz is a 23 y.o. year old female evaluated in the Otolaryngology-Head and Neck Surgery Clinic at [facility]. The patient was referred by Dr. Rao for evaluation of  no status post sinus surgery.  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  She had seen Dr. Jiménez for sinus issues in the past.  She just underwent sinus surgery with maxillary and ethmoid work on the left in [place] in June of 2024.  She was also seen by Dr. Porter at an outside facility postoperatively.  She reports continued tenderness with some retro-orbital pain.  She denies any thick mucoid discharge.  There were has been no further clot or crusts since the last examination.  She was concerned about pain and was wondering about repeat CT scan.  She was told by outside   ENT that she still has mucosal inflammation.            Past Medical/Surgical History  Past Medical History:   Diagnosis Date    Chronic sinusitis, unspecified     Obesity, unspecified      Her  has a past surgical history that includes [place] tooth extraction.     Past Family/Social History  Her family history includes Cancer in her father and mother; Diabetes in her mother; Heart failure in her mother; Hypertension in her mother.  She  reports that she has never smoked. She has never used smokeless tobacco. She reports current alcohol use. She reports that she does not use drugs.     Medications/Allergies/Immunizations  Her current medication(s) include:   Current Outpatient Medications   Medication Sig Dispense Refill    ciclopirox (PENLAC) 8 % Soln Apply topically nightly over nail and surrounding skin over previous coat. After seven (7) days, may remove with alcohol and continue cycle. 6.6 mL 3    clindamycin phosphate (CLINDAGEL) 1 % glqd Apply 1 Application topically daily 75 mL 3    fluticasone propionate (FLONASE) 50 mcg/actuation nasal spray 2 sprays by Nasal route daily 16 g 11     medroxyPROGESTERone (PROVERA) 10 MG tablet Take 1 tablet (10 mg total) by mouth once daily. 10 tablet 0    mupirocin (BACTROBAN) 2 % ointment Apply topically 3 (three) times daily for 7 days 22 g 0    propylene glycoL 0.6 % Drop Apply to eye.      terbinafine HCL (LAMISIL) 1 % cream Apply topically 2 (two) times daily. 28.4 g 0     No current facility-administered medications for this visit.        Allergies: Patient has no known allergies.     Immunizations:   Immunization History   Administered Date(s) Administered    COVID-19, mRNA, LNP-S, bivalent booster, PF (PFIZER OMICRON YEARS 5-11) 12/20/2022    Dtap, Unspecified Formulation 01/22/1999, 03/19/1999, 05/28/1999, 05/25/2000, 12/05/2002    HIB 01/22/1999, 03/19/1999, 05/28/1999, 03/02/2000    HPV Quadrivalent 03/07/2013, 05/17/2013, 03/13/2014    Hepatitis A 01/26/2011, 03/05/2012    Hepatitis A, Pediatric/Adolescent, 2 Dose 03/15/2012    Hepatitis B 1998, 01/22/1999, 09/08/1999    IPV 01/22/1999, 03/04/1999, 05/28/1999, 05/25/2000, 12/05/2002    Influenza 12/01/2009, 12/11/2009, 01/26/2011, 01/20/2019    Influenza - Quadrivalent 10/02/2017    Influenza - Quadrivalent - MDCK - PF 01/19/2019    Influenza - Quadrivalent - PF *Preferred* (6 months and older) 11/15/2013, 10/16/2014, 09/03/2019, 10/15/2020, 12/28/2022, 01/08/2024    MMR 03/02/2000, 12/02/2002    Meningococcal B, OMV 09/17/2018, 01/02/2019    Meningococcal Conjugate 01/15/2010    Meningococcal Conjugate (MCV4P) 01/15/2010, 03/28/2016    Poliovirus 01/22/1999, 03/04/1999, 05/28/1999, 05/25/2000, 12/05/2002    Td (ADULT) 06/14/2018    Tdap 12/30/2008    Varicella 11/30/1999, 01/15/2010         Review of Systems   Constitutional: Negative for fever, weight loss and weight gain.  Skin: Negative for rash, itchiness, dryness  HENT:  As per HPI  Cardiovascular: Negative for chest pain and dyspnea on exertion .   Respiratory: Is not experiencing shortness of breath.   Gastrointestinal: Negative for  nausea and vomiting.   Neurological: Negative for headaches.   Lymph/Heme: Negative for lymphadenopathy or easy bruising  Musculoskeletal: Negative for joint or muscle pain  Psychiatric: The patient is not nervous/anxious.        All other systems are negative except for that listed in the HPI.      PHYSICAL EXAM:   Vital Signs:  /72 (BP Location: Right arm, Patient Position: Sitting, BP Method: Large (Automatic))   Pulse 99   Wt (!) 143.1 kg (315 lb 7.7 oz)   LMP 08/17/2024 (Approximate)   BMI 50.92 kg/m²      General:  Well-developed, well-nourished  Communication and Voice:  Clear pitch and clarity  Hearing: Hearing adequate for verbal communication bilaterally   Inspection:  Normocephalic and atraumatic without mass or lesion  Palpation:  Facial skeleton intact without bony stepoffs  Parotid Glands:  No mass or tenderness  Facial Strength:  Facial motility symmetric and full bilaterally  Pinna:  External ear intact and fully developed  External canal:  Canal is patent with intact skin  Tympanic Membrane:  Clear and mobile  External nose:  No scar or anatomic deformity  Internal Nose:  Septum intact and midline.  see scope for detail  TMJ:  No pain to palpation with full mobility  Oral cavity, Lips, Teeth, and Gums:  Mucosa and teeth intact and viable, No lesions, masses or ulcers  Oropharynx: No erythema or exudate, no masses or ulcerations, non-obstructive tonsils  Nasopharynx:  No mass or lesion with intact mucosa  Hypopharynx:  Not well visualized secondary to gagging  Larynx:  Not well visualized secondary to gagging  Neck, Trachea, Lymphatics:  Midline trachea without mass or lesion, no lymphadenopathy  Thyroid:  No mass or nodularity  Eyes: No nystagmus with equal extraocular motion bilaterally  Neuro/Psych/Balance: Patient oriented and appropriate in interaction;  Appropriate mood and affect;  Gait is intact with no imbalance; Cranial nerves I-XII are intact  Respiratory effort:  Equal  inspiration and expiration without stridor  Peripheral Vascular:  Warm extremities with equal pulses       Procedure: Rigid endoscopic nasal exam   Surgeon: Thad Leo MD  Procedure note/findings: After informed discussion of the risks, benefits, and alternatives after indications as noted above, nasal cavities were topically anesthetized and decongested with 4% lidocaine and Afrin solutions. A rigid 0 degree nasal endoscope was inserted into bilateral nasal cavities and the following was noted    Right:   Both inferior and middle turbinates are normal without hypertrophy  The Osteomeatal complex including the middle and superior meatus does not show any polyps or lesions  The sphenoethmoid recess was clear    Left   Both inferior and middle turbinates are normal without hypertrophy but with mild mucosal edema and erythema in the middle turbinate  The Osteomeatal complex  shows patent recent antrostomy with  ethmoidectomy.  There was no recurrent polyp or lesions but with mild expected postoperative mucosal edema  The sphenoethmoid recess was clear    Nasopharynx, similarly, revealed no mass lesion or granularity. There was no ulceration. There was no gross asymmetry. Fossa of Rosenmueller was intact bilaterally. The eustachian tube orifices were intact bilaterally and patent.  The scope was then withdrawn and removed. She tolerated this well.            ASSESSMENT:   1. Mucosal edema    2. Sinus pain    3. S/P sinus surgery    4. Pain in periorbital region of left eye            PLAN:    I explained that some inflammatory pain may still be present.  I do not believe there is any residual blocked off cell or need for repeat imaging.  I recommended continued irrigation and Flonase.  Follow up with ENT p.r.n.     I believe that Indira Olmedo has a good understanding of the issues involved and I answered all of her questions.     DISCLAIMER: This note was prepared with MModal voice recognition transcription  software. Garbled syntax, mangled pronouns, and other bizarre constructions may be attributed to that software system. While efforts were made to correct any mistakes made by this voice recognition program, some errors and/or omissions may remain in the note that were missed when the note was originally created.

## 2024-09-25 ENCOUNTER — OFFICE VISIT (OUTPATIENT)
Dept: INTERNAL MEDICINE | Facility: CLINIC | Age: 24
End: 2024-09-25
Payer: MEDICAID

## 2024-09-25 ENCOUNTER — LAB VISIT (OUTPATIENT)
Dept: LAB | Facility: HOSPITAL | Age: 24
End: 2024-09-25
Payer: MEDICAID

## 2024-09-25 VITALS
DIASTOLIC BLOOD PRESSURE: 72 MMHG | SYSTOLIC BLOOD PRESSURE: 120 MMHG | OXYGEN SATURATION: 97 % | HEIGHT: 66 IN | WEIGHT: 293 LBS | TEMPERATURE: 98 F | HEART RATE: 101 BPM | BODY MASS INDEX: 47.09 KG/M2 | RESPIRATION RATE: 18 BRPM

## 2024-09-25 DIAGNOSIS — M25.50 ARTHRALGIA, UNSPECIFIED JOINT: ICD-10-CM

## 2024-09-25 DIAGNOSIS — H66.92 LEFT OTITIS MEDIA, UNSPECIFIED OTITIS MEDIA TYPE: ICD-10-CM

## 2024-09-25 DIAGNOSIS — R22.42 LOCALIZED SWELLING OF LEFT LOWER EXTREMITY: Primary | ICD-10-CM

## 2024-09-25 DIAGNOSIS — R10.9 RIGHT SIDED ABDOMINAL PAIN: ICD-10-CM

## 2024-09-25 LAB
ALBUMIN SERPL BCP-MCNC: 3.9 G/DL (ref 3.5–5.2)
ALP SERPL-CCNC: 60 U/L (ref 55–135)
ALT SERPL W/O P-5'-P-CCNC: 33 U/L (ref 10–44)
ANION GAP SERPL CALC-SCNC: 9 MMOL/L (ref 8–16)
AST SERPL-CCNC: 19 U/L (ref 10–40)
BASOPHILS # BLD AUTO: 0.04 K/UL (ref 0–0.2)
BASOPHILS NFR BLD: 0.4 % (ref 0–1.9)
BILIRUB SERPL-MCNC: 0.2 MG/DL (ref 0.1–1)
BUN SERPL-MCNC: 12 MG/DL (ref 6–20)
CALCIUM SERPL-MCNC: 10.1 MG/DL (ref 8.7–10.5)
CHLORIDE SERPL-SCNC: 108 MMOL/L (ref 95–110)
CO2 SERPL-SCNC: 24 MMOL/L (ref 23–29)
CREAT SERPL-MCNC: 0.7 MG/DL (ref 0.5–1.4)
CRP SERPL-MCNC: 4.6 MG/L (ref 0–8.2)
DIFFERENTIAL METHOD BLD: NORMAL
EOSINOPHIL # BLD AUTO: 0.1 K/UL (ref 0–0.5)
EOSINOPHIL NFR BLD: 0.9 % (ref 0–8)
ERYTHROCYTE [DISTWIDTH] IN BLOOD BY AUTOMATED COUNT: 12.7 % (ref 11.5–14.5)
ERYTHROCYTE [SEDIMENTATION RATE] IN BLOOD BY PHOTOMETRIC METHOD: 18 MM/HR (ref 0–36)
EST. GFR  (NO RACE VARIABLE): >60 ML/MIN/1.73 M^2
GLUCOSE SERPL-MCNC: 95 MG/DL (ref 70–110)
HCT VFR BLD AUTO: 39.2 % (ref 37–48.5)
HGB BLD-MCNC: 12.7 G/DL (ref 12–16)
IMM GRANULOCYTES # BLD AUTO: 0.03 K/UL (ref 0–0.04)
IMM GRANULOCYTES NFR BLD AUTO: 0.3 % (ref 0–0.5)
LYMPHOCYTES # BLD AUTO: 2.4 K/UL (ref 1–4.8)
LYMPHOCYTES NFR BLD: 23.8 % (ref 18–48)
MCH RBC QN AUTO: 28.1 PG (ref 27–31)
MCHC RBC AUTO-ENTMCNC: 32.4 G/DL (ref 32–36)
MCV RBC AUTO: 87 FL (ref 82–98)
MONOCYTES # BLD AUTO: 0.5 K/UL (ref 0.3–1)
MONOCYTES NFR BLD: 5.3 % (ref 4–15)
NEUTROPHILS # BLD AUTO: 7 K/UL (ref 1.8–7.7)
NEUTROPHILS NFR BLD: 69.3 % (ref 38–73)
NRBC BLD-RTO: 0 /100 WBC
PLATELET # BLD AUTO: 379 K/UL (ref 150–450)
PMV BLD AUTO: 10.9 FL (ref 9.2–12.9)
POTASSIUM SERPL-SCNC: 3.7 MMOL/L (ref 3.5–5.1)
PROT SERPL-MCNC: 7.6 G/DL (ref 6–8.4)
RBC # BLD AUTO: 4.52 M/UL (ref 4–5.4)
SODIUM SERPL-SCNC: 141 MMOL/L (ref 136–145)
WBC # BLD AUTO: 10.14 K/UL (ref 3.9–12.7)

## 2024-09-25 PROCEDURE — 3074F SYST BP LT 130 MM HG: CPT | Mod: CPTII,,, | Performed by: PHYSICIAN ASSISTANT

## 2024-09-25 PROCEDURE — 3078F DIAST BP <80 MM HG: CPT | Mod: CPTII,,, | Performed by: PHYSICIAN ASSISTANT

## 2024-09-25 PROCEDURE — 1160F RVW MEDS BY RX/DR IN RCRD: CPT | Mod: CPTII,,, | Performed by: PHYSICIAN ASSISTANT

## 2024-09-25 PROCEDURE — 3044F HG A1C LEVEL LT 7.0%: CPT | Mod: CPTII,,, | Performed by: PHYSICIAN ASSISTANT

## 2024-09-25 PROCEDURE — 85025 COMPLETE CBC W/AUTO DIFF WBC: CPT | Performed by: PHYSICIAN ASSISTANT

## 2024-09-25 PROCEDURE — 3008F BODY MASS INDEX DOCD: CPT | Mod: CPTII,,, | Performed by: PHYSICIAN ASSISTANT

## 2024-09-25 PROCEDURE — 86200 CCP ANTIBODY: CPT | Performed by: PHYSICIAN ASSISTANT

## 2024-09-25 PROCEDURE — 99214 OFFICE O/P EST MOD 30 MIN: CPT | Mod: S$PBB,,, | Performed by: PHYSICIAN ASSISTANT

## 2024-09-25 PROCEDURE — 99999 PR PBB SHADOW E&M-EST. PATIENT-LVL V: CPT | Mod: PBBFAC,,, | Performed by: PHYSICIAN ASSISTANT

## 2024-09-25 PROCEDURE — 1159F MED LIST DOCD IN RCRD: CPT | Mod: CPTII,,, | Performed by: PHYSICIAN ASSISTANT

## 2024-09-25 PROCEDURE — 80053 COMPREHEN METABOLIC PANEL: CPT | Performed by: PHYSICIAN ASSISTANT

## 2024-09-25 PROCEDURE — 85652 RBC SED RATE AUTOMATED: CPT | Performed by: PHYSICIAN ASSISTANT

## 2024-09-25 PROCEDURE — 99215 OFFICE O/P EST HI 40 MIN: CPT | Mod: PBBFAC | Performed by: PHYSICIAN ASSISTANT

## 2024-09-25 PROCEDURE — 86431 RHEUMATOID FACTOR QUANT: CPT | Performed by: PHYSICIAN ASSISTANT

## 2024-09-25 PROCEDURE — 86140 C-REACTIVE PROTEIN: CPT | Performed by: PHYSICIAN ASSISTANT

## 2024-09-25 PROCEDURE — 36415 COLL VENOUS BLD VENIPUNCTURE: CPT | Performed by: PHYSICIAN ASSISTANT

## 2024-09-25 PROCEDURE — 86038 ANTINUCLEAR ANTIBODIES: CPT | Performed by: PHYSICIAN ASSISTANT

## 2024-09-25 RX ORDER — AMOXICILLIN 875 MG/1
875 TABLET, FILM COATED ORAL EVERY 12 HOURS
Qty: 14 TABLET | Refills: 0 | Status: SHIPPED | OUTPATIENT
Start: 2024-09-25

## 2024-09-25 NOTE — PROGRESS NOTES
Subjective:       Patient ID: Tawnya Sanz is a 23 y.o. female.        Chief Complaint: Breast Pain    Tawnya Sanz is an established patient of Jennifer Rao MD here today for urgent care visit.    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      URI - sx started 1 1/2 weeks ago, cough and congestion, covid test negative, no fever, left ear feels very full, also right ear bothering her    Stiffness in fingers at times, notices some curvature of the left index finger, no injury that she can recall, no swelling/redness/warmth  Saw rheum in [place] and evaluated foot as concern for psoriatic arthritis, all was negative  She is considering pursuing surgery as her speciality and has concerns about possible autoimmune issue that would complicate this path  She notes h/o +SULEIMAN  Left foot chronically swollen x 3 years    Gets pain in right scapular and breast after eating frequently, feels like gas, sharp and colicky, lasts about an hour, intermittent and ongoing for a year or more  She had abdominal US and breast US in [place], all fine  No N/V  No diarrhea or constipation             Review of Systems   Constitutional:  Negative for chills, diaphoresis, fatigue and fever.   HENT:  Positive for congestion and ear pain. Negative for sore throat.    Eyes:  Negative for visual disturbance.   Respiratory:  Positive for cough. Negative for chest tightness and shortness of breath.    Cardiovascular:  Negative for chest pain, palpitations and leg swelling.   Gastrointestinal:  Negative for abdominal pain, blood in stool, constipation, diarrhea, nausea and vomiting.   Genitourinary:  Negative for dysuria, frequency, hematuria and urgency.   Musculoskeletal:  Positive for arthralgias and joint swelling. Negative for back pain.   Skin:  Negative for rash.   Neurological:  Negative for dizziness, syncope, weakness and headaches.   Psychiatric/Behavioral:  Negative for dysphoric mood  and sleep disturbance. The patient is not nervous/anxious.        Objective:      Physical Exam  Vitals and nursing note reviewed.   Constitutional:       Appearance: Normal appearance. She is well-developed.   HENT:      Head: Normocephalic.      Right Ear: External ear normal. A middle ear effusion is present.      Left Ear: External ear normal. A middle ear effusion is present. Tympanic membrane is erythematous.      Nose: Mucosal edema and rhinorrhea present.      Mouth/Throat:      Pharynx: Oropharynx is clear.   Eyes:      Pupils: Pupils are equal, round, and reactive to light.   Cardiovascular:      Rate and Rhythm: Normal rate and regular rhythm.      Heart sounds: Normal heart sounds. No murmur heard.     No friction rub. No gallop.   Pulmonary:      Effort: Pulmonary effort is normal. No respiratory distress.      Breath sounds: Normal breath sounds.   Abdominal:      Palpations: Abdomen is soft.      Tenderness: There is no abdominal tenderness.   Musculoskeletal:      Comments: Left foot swelling  Mild curvature to left index finger   Skin:     General: Skin is warm and dry.   Neurological:      Mental Status: She is alert.         Assessment:       1. Localized swelling of left lower extremity    2. Arthralgia, unspecified joint    3. Right sided abdominal pain    4. Left otitis media, unspecified otitis media type        Plan:       Tawnya was seen today for breast pain.    Diagnoses and all orders for this visit:    Localized swelling of left lower extremity - will have her see Dr. Pittman for chronic swelling of LLE  -     Ambulatory referral/consult to Vascular Cardiology; Future    Arthralgia, unspecified joint  -     SULEIMAN Screen w/Reflex; Future  -     C-Reactive Protein; Future  -     Cyclic Citrullinated Peptide Antibody, IgG; Future  -     Rheumatoid Factor; Future  -     Sedimentation rate; Future  -     Ambulatory referral/consult to Rheumatology; Future    Right sided  "abdominal/scapular/breast pain - will repeat lab work and check US, she had breast evaluation previously that was normal so defers  -     US Abdomen Complete; Future  -     CBC Auto Differential; Future  -     Comprehensive Metabolic Panel; Future    Left otitis media, unspecified otitis media type  -     amoxicillin (AMOXIL) 875 MG tablet; Take 1 tablet (875 mg total) by mouth every 12 (twelve) hours.    Pt has been given instructions populated from patient instructions database and has verbalized understanding of the after visit summary and information contained wherein.    Follow up with a primary care provider. May go to ER for acute shortness of breath, lightheadedness, fever, or any other emergent complaints or changes in condition.    "This note will be shared with the patient"    Future Appointments   Date Time Provider Department Center   10/2/2024  7:15 AM [facility] OIC-US1 MASTER [facility]   10/4/2024  1:30 PM Cora Malagon MD [facility]SE [facility]                 "

## 2024-09-26 LAB
ANA SER QL IF: NORMAL
CCP AB SER IA-ACNC: 5.5 U/ML
RHEUMATOID FACT SERPL-ACNC: <13 IU/ML (ref 0–15)

## 2024-10-02 ENCOUNTER — HOSPITAL ENCOUNTER (OUTPATIENT)
Dept: RADIOLOGY | Facility: HOSPITAL | Age: 24
Discharge: HOME | End: 2024-10-02
Attending: PHYSICIAN ASSISTANT
Payer: MEDICAID

## 2024-10-02 DIAGNOSIS — R10.9 RIGHT SIDED ABDOMINAL PAIN: ICD-10-CM

## 2024-10-02 PROCEDURE — 76700 US EXAM ABDOM COMPLETE: CPT | Mod: TC

## 2024-10-02 PROCEDURE — 76700 US EXAM ABDOM COMPLETE: CPT | Mod: 26,,, | Performed by: RADIOLOGY

## 2024-10-02 NOTE — PROGRESS NOTES
Past medical, surgical, social, family, and obstetric histories; medications; prior records and results; and available outside records were reviewed and updated in the EMR.  Pertinent findings were noted below.    Reason for Visit   Pelvic Pain    HPI   23 y.o. female     Patient's last menstrual period was 09/10/2024 (approximate).    Here to discuss chronic intermittent pelvic pain. Happens at the beginning of her cycle and mid-cycle. The pain resolves with 600mg ibuprofen once. Wondering if she has endometriosis (her mother did).  Does not go longer than 1 month without a cycle but overall her cycles are less heavy than when she was an adolescent. Wondering if this is okay too.  Has some pain in upper right thigh crease, wondering if this could be an ovarian cyst.  Planning on applying to Gen Surg    Contraception: None - not SA  Pap:  No recent documented pap - hasn't initiated intercourse yet  Mammogram:  N/A    Exam   /82   Wt (!) 145.1 kg (319 lb 14.2 oz)   LMP 09/10/2024 (Approximate)   BMI 51.63 kg/m²     Physical Exam  Constitutional:       General: She is not in acute distress.     Appearance: Normal appearance. She is obese. She is not ill-appearing.   HENT:      Head: Normocephalic and atraumatic.   Pulmonary:      Effort: Pulmonary effort is normal.   Musculoskeletal:         General: Normal range of motion.   Neurological:      General: No focal deficit present.      Mental Status: She is alert and oriented to person, place, and time.   Psychiatric:         Mood and Affect: Mood normal.         Behavior: Behavior normal.         Thought Content: Thought content normal.         Judgment: Judgment normal.   Vitals reviewed.       Assessment and Plan   Chronic pelvic pain in female  -     POCT urine pregnancy      Reassured patient re: her symptoms and answered her questions.

## 2024-10-03 ENCOUNTER — PATIENT MESSAGE (OUTPATIENT)
Dept: HEPATOLOGY | Facility: CLINIC | Age: 24
End: 2024-10-03
Payer: MEDICAID

## 2024-10-03 DIAGNOSIS — E66.01 CLASS 3 SEVERE OBESITY DUE TO EXCESS CALORIES WITH SERIOUS COMORBIDITY AND BODY MASS INDEX (BMI) OF 50.0 TO 59.9 IN ADULT: ICD-10-CM

## 2024-10-03 DIAGNOSIS — E66.813 CLASS 3 SEVERE OBESITY DUE TO EXCESS CALORIES WITH SERIOUS COMORBIDITY AND BODY MASS INDEX (BMI) OF 50.0 TO 59.9 IN ADULT: ICD-10-CM

## 2024-10-03 DIAGNOSIS — K76.0 METABOLIC DYSFUNCTION-ASSOCIATED STEATOTIC LIVER DISEASE (MASLD): Primary | ICD-10-CM

## 2024-10-03 DIAGNOSIS — K76.0 FATTY LIVER: Primary | ICD-10-CM

## 2024-10-04 ENCOUNTER — OFFICE VISIT (OUTPATIENT)
Dept: OBSTETRICS AND GYNECOLOGY | Facility: CLINIC | Age: 24
End: 2024-10-04
Payer: MEDICAID

## 2024-10-04 VITALS — DIASTOLIC BLOOD PRESSURE: 82 MMHG | BODY MASS INDEX: 51.63 KG/M2 | SYSTOLIC BLOOD PRESSURE: 106 MMHG | WEIGHT: 293 LBS

## 2024-10-04 DIAGNOSIS — G89.29 CHRONIC PELVIC PAIN IN FEMALE: Primary | ICD-10-CM

## 2024-10-04 DIAGNOSIS — R10.2 CHRONIC PELVIC PAIN IN FEMALE: Primary | ICD-10-CM

## 2024-10-04 LAB
B-HCG UR QL: NEGATIVE
CTP QC/QA: YES

## 2024-10-04 PROCEDURE — 99999 PR PBB SHADOW E&M-EST. PATIENT-LVL III: CPT | Mod: PBBFAC,,, | Performed by: OBSTETRICS & GYNECOLOGY

## 2024-10-04 PROCEDURE — 99213 OFFICE O/P EST LOW 20 MIN: CPT | Mod: PBBFAC | Performed by: OBSTETRICS & GYNECOLOGY

## 2024-10-16 ENCOUNTER — OFFICE VISIT (OUTPATIENT)
Dept: HEPATOLOGY | Facility: CLINIC | Age: 24
End: 2024-10-16
Payer: MEDICAID

## 2024-10-16 ENCOUNTER — PATIENT MESSAGE (OUTPATIENT)
Dept: HEPATOLOGY | Facility: CLINIC | Age: 24
End: 2024-10-16

## 2024-10-16 VITALS — WEIGHT: 293 LBS | HEIGHT: 66 IN | BODY MASS INDEX: 47.09 KG/M2

## 2024-10-16 DIAGNOSIS — Z83.49 FAMILY HISTORY OF HEMOCHROMATOSIS: ICD-10-CM

## 2024-10-16 DIAGNOSIS — R16.2 HEPATOSPLENOMEGALY: ICD-10-CM

## 2024-10-16 DIAGNOSIS — E66.813 CLASS 3 SEVERE OBESITY DUE TO EXCESS CALORIES WITH SERIOUS COMORBIDITY AND BODY MASS INDEX (BMI) OF 50.0 TO 59.9 IN ADULT: ICD-10-CM

## 2024-10-16 DIAGNOSIS — K76.0 METABOLIC DYSFUNCTION-ASSOCIATED STEATOTIC LIVER DISEASE (MASLD): Primary | ICD-10-CM

## 2024-10-16 DIAGNOSIS — E66.01 CLASS 3 SEVERE OBESITY DUE TO EXCESS CALORIES WITH SERIOUS COMORBIDITY AND BODY MASS INDEX (BMI) OF 50.0 TO 59.9 IN ADULT: ICD-10-CM

## 2024-10-16 NOTE — PROGRESS NOTES
The patient location is: [place]  The chief complaint leading to consultation is: see below    Visit type: audiovisual    Face to Face time with patient: 30 minutes of total time spent on the encounter, which includes face to face time and non-face to face time preparing to see the patient (eg, review of tests), Obtaining and/or reviewing separately obtained history, Documenting clinical information in the electronic or other health record, Independently interpreting results (not separately reported) and communicating results to the patient/family/caregiver, or Care coordination (not separately reported).     Each patient to whom he or she provides medical services by telemedicine is:  (1) informed of the relationship between the physician and patient and the respective role of any other health care provider with respect to management of the patient; and (2) notified that he or she may decline to receive medical services by telemedicine and may withdraw from such care at any time.    Notes:      [facility] VISIT NEW PT NOTE    REFERRING PROVIDER:  Dr. Jennifer Rao  PCP: Jennifer Rao MD     CHIEF COMPLAINT: Metabolic associated steatotic liver disease, family history of HH, hepatosplenomegaly    HPI: This is a 23 y.o. patient with PMH noted below, presenting for evaluation of  above    Mom with fatty liver and hemochromatosis, pt has not been tested in the past    Previous serologic w/u - will screen for Hep B and C    Prior serologic workup:   Lab Results   Component Value Date    ANASCREEN Negative <1:80 09/25/2024     Risk factors for fatty liver include intermittent alcohol use, obesity, HTN    Liver fibrosis staging:  -- MRI elasto soon     Interval HPI: Presents today alone via video visit.   No current dietary restrictions  Drinking diet drinks, no SSB  Intermittent fried/fast foods  Current wt ~319 lbs     Labs done in 2024 show normal transaminase levels      Lab Results   Component  "Value Date    ALT 33 09/25/2024    AST 19 09/25/2024    ALKPHOS 60 09/25/2024    BILITOT 0.2 09/25/2024    ALBUMIN 3.9 09/25/2024     09/25/2024       Abd U/S done 10/2024 showed hepatosplenomegaly, fatty liver     + family history of liver disease: mom with fatty liver. Intermittent Alcohol consumption, see below  Social History     Substance and Sexual Activity   Alcohol Use Yes    Comment: occasional social; 2-3 drinks/month       Immunity to Hep A and B - will check with next labs          Allergy and medication list reviewed and updated     PMHX:  has a past medical history of Chronic sinusitis, unspecified and Obesity, unspecified.    PSHX:  has a past surgical history that includes Gordon tooth extraction.    FAMILY HISTORY: Updated and reviewed in EPIC    ROS:   GENERAL: Denies fatigue  CARDIOVASCULAR: Denies edema  GI: Denies abdominal pain  SKIN: Denies rash, itching   NEURO: Denies confusion, memory loss, or mood changes    PHYSICAL EXAM:   In no acute distress; alert and oriented to person, place and time  VITALS: Ht 5' 6" (1.676 m)   Wt (!) 144.7 kg (319 lb)   LMP 09/10/2024 (Approximate)   BMI 51.49 kg/m²   EYES: Sclerae anicteric  GI: Soft, non-tender, non-distended. No ascites.  EXTREMITIES:  No edema.  SKIN: Warm and dry. No jaundice. No telangectasias noted. No palmar erythema.  NEURO:  No asterixis.  PSYCH:  Thought and speech pattern appropriate. Behavior normal      EDUCATION:  See instructions discussed with patient in Instructions section of the After Visit Summary     ASSESSMENT & PLAN:  23 y.o. female with:  1.  Metabolic associated steatotic liver disease   - see HPI  -- Immunity to Hep A and B : see HPI  -- MRI elasto soon  -- Recommendations discussed with patient:  Limit alcohol consumption, no more than 1 serving(s) of alcohol in any day (1 serving is 5 ounces of wine, 12 ounces of beer, or 1.5 ounces of liquor) and do NOT recommend any daily alcohol use    2 Weight loss goal " of 50-75 lbs  3. Low carb/sugar, high fiber and protein diet, limit your carb intake to LESS than 30-45 grams of carbs with a meal or LESS than 5-10 grams with any snack   4. Exercise, 5 days per week, 30 minutes per day, as tolerated  5. Recommend good cholesterol, blood pressure, blood sugar levels   6. There is a new medication called Rezdiffra for the treatment of F2-F3 liver scarring due to fatty liver. It is only indicated for those with stage 2 or 3 scar tissue (will see after MRI elasto)    2. Family history of hemochromatosis   Labs to screen pt     3. Obesity  -- Body mass index is 51.49 kg/m².   -- increases risk for fatty liver          Follow up for pending results of workup. Based on labs and MRI elasto  Orders Placed This Encounter   Procedures    Hepatic Function Panel    Ferritin    Iron and TIBC    Hemochromatosis DNA Analysis (PCR)    Hepatitis A antibody, IgG    Hepatitis B Core Antibody, Total    Hepatitis B Surface Ab, Qualitative    Hepatitis B Surface Antigen    Hepatitis C Antibody        Thank you for allowing me to participate in the care of SELIN Carbajal    I spent a total of 30 minutes on the day of the visit.This includes face to face time and non-face to face time preparing to see the patient (eg, review of tests), obtaining and/or reviewing separately obtained history, documenting clinical information in the electronic or other health record, independently interpreting results and communicating results to the patient/family/caregiver, and coordinating care.         CC'ed note to:   Jennifer Rao MD

## 2024-10-16 NOTE — PATIENT INSTRUCTIONS
1. Fibroscan to look for fat or scar tissue in the liver with return to clinic   2. Will check immunity markers for Hepatitis A and B and arrange for vaccination if needed  3. Labs before return to clinic to  check for multiple causes of liver disease. These labs will release to you as soon as they are resulted but we will discuss them in detail at your upcoming visit to discuss what the lab results mean.   4.  Follow up based on MRI    These things are important to improve fatty liver:  Limit alcohol consumption, no more than 1 serving(s) of alcohol in any day (1 serving is 5 ounces of wine, 12 ounces of beer, or 1.5 ounces of liquor) and do NOT recommend any daily alcohol use    Weight loss goal of 50 lbs. [facility] offers benefits to patients so let me know if you want a referral to the [facility] gym. Also, [facility] has dieticians that can create a weight loss plan. If you are interested let me know and I can place a referral. If so, contact the dietician team at [facility] at email [email or web address] or call [phone]  to get scheduled. They do offer video visits   Avoid processed foods. No fried/fast foods. No sugary drinks or drinks with any calories.    Low carb/sugar and high protein diet (120 grams per day of protein).Try to limit your carb intake to LESS than  grams per day total. Use TrillTip Pal or Lose It fareed to add up your carbs through the day. Do NOT drink any beverages with calories or carbs (this can lead to high blood sugar and weight gain). The main thing to focus on is high protein, low carb)  Exercise, 5 days per week, 30 minutes per day, as tolerated  Recommend good cholesterol, blood pressure, blood sugar levels   There is a new medication called Rezdiffra for the treatment of F2-F3 liver scarring due to fatty liver. It is only indicated for patients with significant scar tissue from fatty liver (will reassess after MRI)    In  some people, fatty liver can progress to steatohepatitis (inflamatory fatty liver) and possibly to cirrhosis, putting one at increased risk for liver cancer, liver failure, and death. Therefore, the lifestyle changes are very important to decrease this risk.     Helpful website for [facility]/fatty liver: [email or web address]

## 2024-10-22 ENCOUNTER — LAB VISIT (OUTPATIENT)
Dept: LAB | Facility: HOSPITAL | Age: 24
End: 2024-10-22
Payer: MEDICAID

## 2024-10-22 DIAGNOSIS — Z83.49 FAMILY HISTORY OF HEMOCHROMATOSIS: ICD-10-CM

## 2024-10-22 DIAGNOSIS — K76.0 METABOLIC DYSFUNCTION-ASSOCIATED STEATOTIC LIVER DISEASE (MASLD): ICD-10-CM

## 2024-10-22 LAB
ALBUMIN SERPL BCP-MCNC: 3.7 G/DL (ref 3.5–5.2)
ALP SERPL-CCNC: 58 U/L (ref 40–150)
ALT SERPL W/O P-5'-P-CCNC: 22 U/L (ref 10–44)
AST SERPL-CCNC: 14 U/L (ref 10–40)
BILIRUB DIRECT SERPL-MCNC: 0.2 MG/DL (ref 0.1–0.3)
BILIRUB SERPL-MCNC: 0.5 MG/DL (ref 0.1–1)
FERRITIN SERPL-MCNC: 62 NG/ML (ref 20–300)
HAV IGG SER QL IA: REACTIVE
HBV CORE AB SERPL QL IA: NORMAL
HBV SURFACE AB SER-ACNC: 47.69 MIU/ML
HBV SURFACE AB SER-ACNC: REACTIVE M[IU]/ML
HBV SURFACE AG SERPL QL IA: NORMAL
HCV AB SERPL QL IA: NORMAL
IRON SERPL-MCNC: 100 UG/DL (ref 30–160)
PROT SERPL-MCNC: 6.9 G/DL (ref 6–8.4)
SATURATED IRON: 25 % (ref 20–50)
TOTAL IRON BINDING CAPACITY: 401 UG/DL (ref 250–450)
TRANSFERRIN SERPL-MCNC: 271 MG/DL (ref 200–375)

## 2024-10-22 PROCEDURE — 86790 VIRUS ANTIBODY NOS: CPT | Performed by: NURSE PRACTITIONER

## 2024-10-22 PROCEDURE — 83540 ASSAY OF IRON: CPT | Performed by: NURSE PRACTITIONER

## 2024-10-22 PROCEDURE — 84466 ASSAY OF TRANSFERRIN: CPT | Performed by: NURSE PRACTITIONER

## 2024-10-22 PROCEDURE — 86704 HEP B CORE ANTIBODY TOTAL: CPT | Performed by: NURSE PRACTITIONER

## 2024-10-22 PROCEDURE — 86803 HEPATITIS C AB TEST: CPT | Performed by: NURSE PRACTITIONER

## 2024-10-22 PROCEDURE — 82728 ASSAY OF FERRITIN: CPT | Performed by: NURSE PRACTITIONER

## 2024-10-22 PROCEDURE — 81256 HFE GENE: CPT | Performed by: NURSE PRACTITIONER

## 2024-10-22 PROCEDURE — 80076 HEPATIC FUNCTION PANEL: CPT | Performed by: NURSE PRACTITIONER

## 2024-10-22 PROCEDURE — 87340 HEPATITIS B SURFACE AG IA: CPT | Performed by: NURSE PRACTITIONER

## 2024-10-22 PROCEDURE — 86706 HEP B SURFACE ANTIBODY: CPT | Performed by: NURSE PRACTITIONER

## 2024-10-25 ENCOUNTER — PATIENT MESSAGE (OUTPATIENT)
Dept: INTERNAL MEDICINE | Facility: CLINIC | Age: 24
End: 2024-10-25
Payer: MEDICAID

## 2024-10-25 LAB
GENETICIST REVIEW: NORMAL
HFE GENE MUT ANL BLD/T: NORMAL
HFE RELEASED BY: NORMAL
HFE RESULT SUMMARY: NORMAL
REF LAB TEST METHOD: NORMAL
SPECIMEN SOURCE: NORMAL
SPECIMEN,  HEMOCHROMATOSIS: NORMAL

## 2024-10-29 ENCOUNTER — PATIENT MESSAGE (OUTPATIENT)
Dept: HEPATOLOGY | Facility: CLINIC | Age: 24
End: 2024-10-29
Payer: MEDICAID

## 2024-10-29 PROBLEM — Z14.8 CARRIER OF HEMOCHROMATOSIS HFE GENE MUTATION: Status: ACTIVE | Noted: 2024-10-29

## 2024-10-31 ENCOUNTER — OFFICE VISIT (OUTPATIENT)
Dept: INTERNAL MEDICINE | Facility: CLINIC | Age: 24
End: 2024-10-31
Payer: MEDICAID

## 2024-10-31 ENCOUNTER — PATIENT MESSAGE (OUTPATIENT)
Dept: INTERNAL MEDICINE | Facility: CLINIC | Age: 24
End: 2024-10-31
Payer: MEDICAID

## 2024-10-31 VITALS
DIASTOLIC BLOOD PRESSURE: 70 MMHG | WEIGHT: 293 LBS | BODY MASS INDEX: 47.09 KG/M2 | OXYGEN SATURATION: 99 % | HEART RATE: 68 BPM | SYSTOLIC BLOOD PRESSURE: 110 MMHG | HEIGHT: 66 IN

## 2024-10-31 DIAGNOSIS — Z29.89 NEED FOR MALARIA PROPHYLAXIS: ICD-10-CM

## 2024-10-31 DIAGNOSIS — E66.01 CLASS 3 SEVERE OBESITY DUE TO EXCESS CALORIES WITH SERIOUS COMORBIDITY AND BODY MASS INDEX (BMI) OF 50.0 TO 59.9 IN ADULT: ICD-10-CM

## 2024-10-31 DIAGNOSIS — Z00.00 HEALTH CARE MAINTENANCE: ICD-10-CM

## 2024-10-31 DIAGNOSIS — K76.0 METABOLIC DYSFUNCTION-ASSOCIATED STEATOTIC LIVER DISEASE (MASLD): Primary | ICD-10-CM

## 2024-10-31 DIAGNOSIS — E66.813 CLASS 3 SEVERE OBESITY DUE TO EXCESS CALORIES WITH SERIOUS COMORBIDITY AND BODY MASS INDEX (BMI) OF 50.0 TO 59.9 IN ADULT: ICD-10-CM

## 2024-10-31 PROCEDURE — 99213 OFFICE O/P EST LOW 20 MIN: CPT | Mod: PBBFAC | Performed by: INTERNAL MEDICINE

## 2024-10-31 PROCEDURE — 99999 PR PBB SHADOW E&M-EST. PATIENT-LVL III: CPT | Mod: PBBFAC,,, | Performed by: INTERNAL MEDICINE

## 2024-10-31 RX ORDER — TIRZEPATIDE 2.5 MG/.5ML
2.5 INJECTION, SOLUTION SUBCUTANEOUS
Qty: 3 ML | Refills: 0 | Status: SHIPPED | OUTPATIENT
Start: 2024-10-31

## 2024-10-31 RX ORDER — DOXYCYCLINE HYCLATE 100 MG
100 TABLET ORAL DAILY
Qty: 30 TABLET | Refills: 0 | Status: SHIPPED | OUTPATIENT
Start: 2024-10-31

## 2024-11-01 ENCOUNTER — PATIENT MESSAGE (OUTPATIENT)
Dept: INTERNAL MEDICINE | Facility: CLINIC | Age: 24
End: 2024-11-01
Payer: MEDICAID

## 2024-11-07 ENCOUNTER — HOSPITAL ENCOUNTER (OUTPATIENT)
Dept: RADIOLOGY | Facility: HOSPITAL | Age: 24
Discharge: HOME | End: 2024-11-07
Attending: NURSE PRACTITIONER
Payer: MEDICAID

## 2024-11-07 ENCOUNTER — OFFICE VISIT (OUTPATIENT)
Dept: CARDIOLOGY | Facility: CLINIC | Age: 24
End: 2024-11-07
Payer: MEDICAID

## 2024-11-07 VITALS
HEART RATE: 90 BPM | BODY MASS INDEX: 47.09 KG/M2 | DIASTOLIC BLOOD PRESSURE: 70 MMHG | HEIGHT: 66 IN | WEIGHT: 293 LBS | SYSTOLIC BLOOD PRESSURE: 146 MMHG

## 2024-11-07 DIAGNOSIS — R22.42 LOCALIZED SWELLING OF LEFT LOWER EXTREMITY: ICD-10-CM

## 2024-11-07 DIAGNOSIS — E66.813 CLASS 3 SEVERE OBESITY DUE TO EXCESS CALORIES WITH SERIOUS COMORBIDITY AND BODY MASS INDEX (BMI) OF 50.0 TO 59.9 IN ADULT: ICD-10-CM

## 2024-11-07 DIAGNOSIS — L03.116 CELLULITIS OF LEFT LOWER EXTREMITY: ICD-10-CM

## 2024-11-07 DIAGNOSIS — K76.0 METABOLIC DYSFUNCTION-ASSOCIATED STEATOTIC LIVER DISEASE (MASLD): ICD-10-CM

## 2024-11-07 DIAGNOSIS — E66.01 CLASS 3 SEVERE OBESITY DUE TO EXCESS CALORIES WITH SERIOUS COMORBIDITY AND BODY MASS INDEX (BMI) OF 50.0 TO 59.9 IN ADULT: ICD-10-CM

## 2024-11-07 DIAGNOSIS — R60.0 EDEMA, PERIPHERAL: ICD-10-CM

## 2024-11-07 DIAGNOSIS — I89.0 LYMPHEDEMA OF BOTH LOWER EXTREMITIES: Primary | ICD-10-CM

## 2024-11-07 PROCEDURE — 76391 MR ELASTOGRAPHY: CPT | Mod: TC

## 2024-11-07 PROCEDURE — 99214 OFFICE O/P EST MOD 30 MIN: CPT | Mod: PBBFAC | Performed by: INTERNAL MEDICINE

## 2024-11-07 PROCEDURE — 76391 MR ELASTOGRAPHY: CPT | Mod: 26,,, | Performed by: STUDENT IN AN ORGANIZED HEALTH CARE EDUCATION/TRAINING PROGRAM

## 2024-11-07 PROCEDURE — 99999 PR PBB SHADOW E&M-EST. PATIENT-LVL IV: CPT | Mod: PBBFAC,,, | Performed by: INTERNAL MEDICINE

## 2024-11-07 NOTE — PROGRESS NOTES
[facility]      Chief Complaint   Patient presents with    Cellulitis of left lower extremity     Edema       Patient ID: Tawnya Sanz is a 23 y.o. female with MASLD, morbid obesity, who presents for an initial appointment. Pertinent history/events are as follows:     -Pt kindly referred by Barb BOWER-MAKENZIE for evaluation of localized swelling of left lower extremity .    HPI:  Ms. Sanz reports left leg swelling starting in 1/2022.  She has a bout of LLE cellulitis in 3/2022.  States leg swelling occurs daily.  No claudication symptoms or tissue loss. 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    Chronic sinusitis, unspecified     Obesity, unspecified      Past Surgical History:   Procedure Laterality Date    WISDOM TOOTH EXTRACTION       Social History     Socioeconomic History    Marital status: Single   Occupational History    Occupation: medical student   Tobacco Use    Smoking status: Never    Smokeless tobacco: Never   Substance and Sexual Activity    Alcohol use: Yes     Comment: occasional social; 2-3 drinks/month    Drug use: Never    Sexual activity: Never   Social History Narrative    Medical student at      Social Drivers of Health     Financial Resource Strain: Low Risk  (1/15/2024)    Overall Financial Resource Strain (CARDIA)     Difficulty of Paying Living Expenses: Not very hard   Food Insecurity: No Food Insecurity (1/15/2024)    Hunger Vital Sign     Worried About Running Out of Food in the Last Year: Never true     Ran Out of Food in the Last Year: Never true   Transportation Needs: No Transportation Needs (1/15/2024)    PRAPARE - Transportation     Lack of Transportation (Medical): No     Lack of Transportation (Non-Medical): No   Physical Activity: Sufficiently Active (7/29/2024)    Received from [facility]    Exercise Vital Sign     Days of Exercise  per Week: 5 days     Minutes of Exercise per Session: 60 min   Stress: No Stress Concern Present (7/29/2024)    Received from [facility] - Occupational Stress Questionnaire     Feeling of Stress : Only a little   Housing Stability: High Risk (1/15/2024)    Housing Stability Vital Sign     Unable to Pay for Housing in the Last Year: No     Number of Places Lived in the Last Year: 3     Unstable Housing in the Last Year: No     Family History   Problem Relation Name Age of Onset    Cancer Mother          thyroid    Hypertension Mother      Heart failure Mother      Diabetes Mother      Cancer Father          skin - unsure type       Review of patient's allergies indicates:  No Known Allergies    Medication List with Changes/Refills   Current Medications    BUDESONIDE (PULMICORT) 0.5 MG/2 ML NEBULIZER SOLUTION    Use 1 ampule, mixed with saline, rinsed through nose twice a day    CICLOPIROX (PENLAC) 8 % SOLN    Apply topically nightly over nail and surrounding skin over previous coat. After seven (7) days, may remove with alcohol and continue cycle.    CLINDAMYCIN PHOSPHATE (CLINDAGEL) 1 % GLQD    Apply 1 Application topically daily    DOXYCYCLINE (VIBRA-TABS) 100 MG TABLET    Take 1 tablet (100 mg total) by mouth once daily.    FLUTICASONE PROPIONATE (FLONASE) 50 MCG/ACTUATION NASAL SPRAY    2 sprays by Nasal route daily    MEDROXYPROGESTERONE (PROVERA) 10 MG TABLET    Take 1 tablet (10 mg total) by mouth once daily.    PROPYLENE GLYCOL 0.6 % DROP    Apply to eye as needed.    TERBINAFINE HCL (LAMISIL) 1 % CREAM    Apply topically 2 (two) times daily.   Discontinued Medications    TIRZEPATIDE, WEIGHT LOSS, (ZEPBOUND) 2.5 MG/0.5 ML PNIJ    Inject 2.5 mg into the skin every 7 days.       Review of Systems  Constitution: Denies chills, fever, and sweats.  HENT: Denies headaches or blurry vision.  Cardiovascular: Denies chest pain or irregular heart beat.  Respiratory: Denies  "cough or shortness of breath.  Gastrointestinal: Denies abdominal pain, nausea, or vomiting.  Musculoskeletal: Denies muscle cramps.  Neurological: Denies dizziness or focal weakness.  Psychiatric/Behavioral: Normal mental status.  Hematologic/Lymphatic: Denies bleeding problem or easy bruising/bleeding.  Skin: Denies rash or suspicious lesions    Physical Examination  BP (!) 146/70 (BP Location: Left arm, Patient Position: Sitting)   Pulse 90   Ht 5' 6" (1.676 m)   Wt (!) 144.9 kg (319 lb 7.1 oz)   BMI 51.56 kg/m²     Constitutional: No acute distress, conversant  HEENT: Sclera anicteric, Pupils equal, round and reactive to light, extraocular motions intact, Oropharynx clear  Neck: No JVD, no carotid bruits  Cardiovascular: regular rate and rhythm, no murmur, rubs or gallops, normal S1/S2  Pulmonary: Clear to auscultation bilaterally  Abdominal: Abdomen soft, nontender, nondistended, positive bowel sounds  Extremities: No lower extremity edema,   Pulses:  Carotid pulses are 2+ on the right side, and 2+ on the left side.  Radial pulses are 2+ on the right side, and 2+ on the left side.   Femoral pulses are 2+ on the right side, and 2+ on the left side.  Popliteal pulses are 2+ on the right side, and 2+ on the left side.   Dorsalis pedis pulses are 2+ on the right side, and 2+ on the left side.   Posterior tibial pulses are 2+ on the right side, and 2+ on the left side.    Skin: No ecchymosis, erythema, or ulcers  Psych: Alert and oriented x 3, appropriate affect  Neuro: CNII-XII intact, no focal deficits    Labs:  Most Recent Data  CBC:   Lab Results   Component Value Date    WBC 10.14 09/25/2024    HGB 12.7 09/25/2024    HCT 39.2 09/25/2024     09/25/2024    MCV 87 09/25/2024    RDW 12.7 09/25/2024     BMP:   Lab Results   Component Value Date     09/25/2024    K 3.7 09/25/2024     09/25/2024    CO2 24 09/25/2024    BUN 12 09/25/2024    CREATININE 0.7 09/25/2024    GLU 95 09/25/2024    " "CALCIUM 10.1 09/25/2024     LFTS;   Lab Results   Component Value Date    PROT 6.9 10/22/2024    ALBUMIN 3.7 10/22/2024    BILITOT 0.5 10/22/2024    AST 14 10/22/2024    ALKPHOS 58 10/22/2024    ALT 22 10/22/2024     COAGS: No results found for: "INR", "PROTIME", "PTT"  FLP:   Lab Results   Component Value Date    CHOL 155 02/23/2024    HDL 53 02/23/2024    LDLCALC 91.6 02/23/2024    TRIG 52 02/23/2024    CHOLHDL 34.2 02/23/2024     CARDIAC: No results found for: "TROPONINI", "CKTOTAL", "CKMB", "BNP"    Imaging:    Assessment/Plan:  Tawnya Sanz is a 23 y.o. female with MASLD, morbid obesity, who presents for an initial appointment.     LLE Edema- Due to lymphedema and possible venous insufficiency. Check BLE venous reflux study and KASSIE study. Pt presents with findings consistent with mild lymphedema.  Refer to lymphedema clinic.  Recommend wearing graduated compression hose.  Limit sodium intake to 2000 mg daily.  Limit volume intake to 1.5 L daily.  Elevate legs when resting.    2. Morbid Obesity- Encourage diet, exercise, and weight loss.    Follow up in 6 months    Total duration of face to face visit time 30 minutes.  Total time spent counseling greater than fifty percent of total visit time.  Counseling included discussion regarding imaging findings, diagnosis, possibilities, treatment options, risks and benefits.  The patient had many questions regarding the options and long-term effects.    Bentley Pittman MD, PhD  Interventional Cardiology      "

## 2024-11-08 ENCOUNTER — PATIENT MESSAGE (OUTPATIENT)
Dept: INTERNAL MEDICINE | Facility: CLINIC | Age: 24
End: 2024-11-08
Payer: MEDICAID

## 2024-11-12 ENCOUNTER — TELEPHONE (OUTPATIENT)
Dept: HEPATOLOGY | Facility: CLINIC | Age: 24
End: 2024-11-12
Payer: MEDICAID

## 2024-11-12 DIAGNOSIS — E66.01 CLASS 3 SEVERE OBESITY DUE TO EXCESS CALORIES WITH SERIOUS COMORBIDITY AND BODY MASS INDEX (BMI) OF 50.0 TO 59.9 IN ADULT: ICD-10-CM

## 2024-11-12 DIAGNOSIS — K76.0 METABOLIC DYSFUNCTION-ASSOCIATED STEATOTIC LIVER DISEASE (MASLD): Primary | ICD-10-CM

## 2024-11-12 DIAGNOSIS — E66.813 CLASS 3 SEVERE OBESITY DUE TO EXCESS CALORIES WITH SERIOUS COMORBIDITY AND BODY MASS INDEX (BMI) OF 50.0 TO 59.9 IN ADULT: ICD-10-CM

## 2024-11-12 DIAGNOSIS — D35.00 ADRENAL ADENOMA, UNSPECIFIED LATERALITY: ICD-10-CM

## 2024-11-12 NOTE — TELEPHONE ENCOUNTER
Results of MRI with explanation sent [facility], please contact pt to schedule f/u with me in 1 year with MRI elasto a few days before (virtual ok)    Thanks !

## 2024-11-14 ENCOUNTER — HOSPITAL ENCOUNTER (OUTPATIENT)
Dept: CARDIOLOGY | Facility: HOSPITAL | Age: 24
Discharge: HOME | End: 2024-11-14
Attending: INTERNAL MEDICINE
Payer: MEDICAID

## 2024-11-14 DIAGNOSIS — I89.0 LYMPHEDEMA OF BOTH LOWER EXTREMITIES: ICD-10-CM

## 2024-11-14 LAB
LEFT GREAT SAPHENOUS DISTAL THIGH DIA: 0.44 CM
LEFT GREAT SAPHENOUS JUNCTION DIA: 0.63 CM
LEFT GREAT SAPHENOUS JUNCTION REFLUX: 742 MS
LEFT GREAT SAPHENOUS KNEE DIA: 0.5 CM
LEFT GREAT SAPHENOUS KNEE REFLUX: 2620 MS
LEFT GREAT SAPHENOUS MIDDLE THIGH DIA: 0.5 CM
LEFT GREAT SAPHENOUS PROXIMAL CALF DIA: 0.34 CM
LEFT SMALL SAPHENOUS KNEE DIA: 0.32 CM
LEFT SMALL SAPHENOUS SPJ DIA: 0.25 CM
RIGHT GREAT SAPHENOUS DISTAL THIGH DIA: 0.5 CM
RIGHT GREAT SAPHENOUS JUNCTION DIA: 0.66 CM
RIGHT GREAT SAPHENOUS JUNCTION REFLUX: 794 MS
RIGHT GREAT SAPHENOUS KNEE DIA: 0.48 CM
RIGHT GREAT SAPHENOUS MIDDLE THIGH DIA: 0.53 CM
RIGHT GREAT SAPHENOUS PROXIMAL CALF DIA: 0.33 CM
RIGHT SMALL SAPHENOUS KNEE DIA: 0.44 CM
RIGHT SMALL SAPHENOUS SPJ DIA: 0.32 CM

## 2024-11-14 PROCEDURE — 93922 UPR/L XTREMITY ART 2 LEVELS: CPT

## 2024-11-14 PROCEDURE — 93922 UPR/L XTREMITY ART 2 LEVELS: CPT | Mod: 26,,, | Performed by: INTERNAL MEDICINE

## 2024-11-14 PROCEDURE — 93970 EXTREMITY STUDY: CPT | Mod: 26,,, | Performed by: INTERNAL MEDICINE

## 2024-11-14 PROCEDURE — 93970 EXTREMITY STUDY: CPT | Mod: TC

## 2024-11-15 ENCOUNTER — CLINICAL SUPPORT (OUTPATIENT)
Dept: INFECTIOUS DISEASES | Facility: CLINIC | Age: 24
End: 2024-11-15

## 2024-11-15 DIAGNOSIS — Z71.84 TRAVEL ADVICE ENCOUNTER: Primary | ICD-10-CM

## 2024-11-15 LAB
LEFT ABI: 1.05
LEFT ARM BP: 130 MMHG
LEFT DORSALIS PEDIS: 134 MMHG
LEFT POSTERIOR TIBIAL: 139 MMHG
RIGHT ABI: 1.02
RIGHT ARM BP: 132 MMHG
RIGHT DORSALIS PEDIS: 130 MMHG
RIGHT POSTERIOR TIBIAL: 134 MMHG

## 2024-12-05 ENCOUNTER — TELEPHONE (OUTPATIENT)
Dept: CARDIOLOGY | Facility: CLINIC | Age: 24
End: 2024-12-05
Payer: MEDICAID

## 2024-12-05 NOTE — TELEPHONE ENCOUNTER
Lymphedema Pump Progress:  [x] Consult, clinical notes, and face sheet faxed to [facility] for home pneumatic compression.  [x] Reached out to patient for follow up. Wish to inquire about symptoms of lymphedema and if conservative therapy has been effective.  [] Updated progress note sent to [facility].  [] Patient pneumatic compression pump approved and shipped by [facility].

## 2024-12-27 ENCOUNTER — PATIENT MESSAGE (OUTPATIENT)
Dept: CARDIOLOGY | Facility: CLINIC | Age: 24
End: 2024-12-27
Payer: MEDICAID

## 2025-01-08 ENCOUNTER — PATIENT MESSAGE (OUTPATIENT)
Dept: CARDIOLOGY | Facility: CLINIC | Age: 25
End: 2025-01-08
Payer: MEDICAID

## 2025-01-08 ENCOUNTER — CLINICAL SUPPORT (OUTPATIENT)
Dept: REHABILITATION | Facility: HOSPITAL | Age: 25
End: 2025-01-08
Payer: MEDICAID

## 2025-01-08 DIAGNOSIS — I89.0 LYMPHEDEMA OF BOTH LOWER EXTREMITIES: ICD-10-CM

## 2025-01-08 PROCEDURE — 97535 SELF CARE MNGMENT TRAINING: CPT

## 2025-01-08 PROCEDURE — 97165 OT EVAL LOW COMPLEX 30 MIN: CPT

## 2025-01-09 NOTE — PLAN OF CARE
[facility]  Occupational Therapy Initial Evaluation  Lymphedema      Name: Tawnya Sanz  Clinic Number: [id number]    Therapy Diagnosis:   Encounter Diagnosis   Name Primary?    Lymphedema of both lower extremities      Physician: Bentley Pittman MD*    Physician Orders: Eval and Treat  Medical Diagnosis: I89.0 (ICD-10-CM) - Lymphedema of both lower extremities   Evaluation Date: 1/8/2025  Insurance Authorization Period Expiration: 11/7/2025  Plan of Care Certification Period: 4/1/2025  Visit # / Visits authorized: 1 / 1  FOTO: see media, 1 / 3    Precautions:  Standard    Time In: 2:42  Time Out: 3:35  Total Appointment Time (timed & untimed codes): 48 minutes    Subjective      Date of onset: January 2022  History of current condition - Tawnya reports: Swelling developed in LLE one week after she fell and injured her shin and foot on 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 One month later swelling progressed and erythema presented so she sought medical attention and was prescribed steroids. She denies dx of lymphedema or cellulitis at that time but acute symptoms resolved. Swelling and increased girth of digits and dorsum of foot has persisted since incident. She was recently dx with lymphedema by Dr. Pittman.  Pt has never worn compression garments.  Alleviating factors include elevation, worsening factors include dependency. Associated symptoms include pain along posterior calf.  Previous Lymphedema Therapy: No.      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   Recent falls: no    Current Functional  "Status   Home/Living environment: lives with their roommate         Limitation of Functional Status as follows:   ADLs/IADLs:     - Feeding: independent    - Bathing: independent    - Dressing/Grooming: independent    - Home Management: independent    - Driving: independent    Patient's Goals for Therapy: "I'd love to get it down. It would be nice to not feel that tightness as severe."     Past Medical History/Physical Systems Review:   Tawnya aSnz  has a past medical history of Carrier of hemochromatosis HFE gene mutation, Chronic sinusitis, unspecified, and Obesity, unspecified.    Tawnya Sanz  has a past surgical history that includes [place] tooth extraction.    Tawnya has a current medication list which includes the following prescription(s): budesonide, ciclopirox, clindamycin phosphate, doxycycline, fluticasone propionate, medroxyprogesterone, propylene glycol, and terbinafine hcl.    Review of patient's allergies indicates:  No Known Allergies     Pt denies: CHF,CKD, DVT, CA, infection, severe PAD  Pt admits medically managed/treated: cellulitis     Objective      Patient received from waiting room.   Mental status: alert, oriented to person, place, and time  Appearance: Well groomed  Behavior:  calm and cooperative  Attention Span and Concentration:  Normal    Affected Areas: LLE  Refill: Day   Stemmer Sign: Positive  Shape: increased girth in foot, digits  Tissue Texture: soft, pliable, pitting  Skin Integrity: intact  Sensation: intact  Circulation: intact      LE Girth Measurements: taken in supine  LANDMARK RIGHT LE  1/8/25 LEFT LE  1/8/25   SBP - -   10 below SBP 51.0 cm 51.0 cm   20 below SBP 48.0 cm 48.0 cm   30 below SBP 37.0 cm 37.0 cm   35 below SBP 29.0 cm 29.0 cm   Ankle 28.0 cm 29.0 cm   Forefoot 26.0 cm 27.0 cm       Picture taken via [employer] on therapist's cell phone with verbal consent from patient:      Limitation/Restriction for FOTO Lower Extremity Edema " Survey    Therapist reviewed FOTO scores for Tawnya Sanz on 1/8/2025.   FOTO documents entered into [employer] - see Media section.    Limitation Score: see media         Treatment      Total Treatment time (time-based codes) separate from Evaluation: 15 minutes    Tawnya received the treatments listed below:      Self-care/home management techniques to improve functional performance for 15  minutes, including:    Patient was educated in   Compression needs: 20-30 mm hg knee highs  Home management plan:  Wear schedule: [place] first thing in the morning, remove at the end of the day as close to bedtime as possible. Do not sleep in garments. If using a home pneumatic pump, remove garments when using pump. If it is not yet bedtime, resume wearing garments until bed.  Donning/doffing techniques  Cost/coverage of compression garments: Medicare now pays for compression. Private insurance coverage is on a case-by-case basis. In order to determine coverage an Rx with a diagnosis of lymphedema is sent to a participating DME for a benefits check.   Commitment to attendance and securing compression needs are critical to lymphedema management  Pneumatic pump benefits, set-up, use, referral process, coverage through insurance. Coverage is based on insurance plan as well as if deductible has been met. Pneumatic pump is an added tool for managing lymphedema at home and is meant to be used in addition to wearing compression garments daily but does not substitute compression garments.   Monitor for signs/symptoms of infection and seek medical attention immediately if symptoms occur.      Patient Education and Home Exercises      Education provided:   1. Educated on definition of lymphedema.  2. Explained the Complete Decongestive Therapy protocol in depth  3. Educated on Phase 1 and 2 of protocol.  4. Reviewed treatment frequency and likely duration of weeks  5.Contraindications for treatment.  6. Plan of care and goals.  7.  Educated on home management protocols.   8. Role of OT, goals for OT, scheduling/cancellations, insurance limitations.  9. Provided lymphedema educational pamphlet        Assessment      Tawnya is a 24 y.o. female referred to outpatient Occupational Therapy with a medical diagnosis of lymphedema. This patient presents with stage 1 lymphedema due to CVI vs ?primary onset vs trauma, injury.  Patient's deficits include swelling of the LLE, placing the pt at higher risk of infection. Therapist's recommendation includes  complete decongestive therapy: manual lymphatic drainage, pneumatic compression device, multi-layer bandaging, compression garments, elevation, exercise 20-30 mm hg knee high for 4 weeks to to reduce size of LLE to reduce risk of wounds, infections, and poor skin integrity as well as education to self-maintain reductions with use of compression garments.     Pt has stage 1 lymphedema secondary to injury, trauma, CVI vs ?primary onset and is in the active (phase 1) stage of treatment. A compression garment is required to reduce and maintain limb girth and prevent progression of lymphedema to prevent infections, wounds, pain, and orthopedic issues.       Anticipated barriers to occupational therapy: none    Plan of care discussed with patient: Yes  Patient's spiritual, cultural and educational needs considered and patient is agreeable to the plan of care and goals as stated below:     Medical Necessity is demonstrated by the following  Occupational Profile/History  Co-morbidities and personal factors that may impact the plan of care [x] LOW: Brief chart review  [] MODERATE: Expanded chart review   [] HIGH: Extensive chart review    Moderate / High Support Documentation: N/A     Examination  Performance deficits relating to physical, cognitive or psychosocial skills that result in activity limitations and/or participation restrictions  [x] LOW: addressing 1-3 Performance deficits  [] MODERATE: 3-5  Performance deficits  [] HIGH: 5+ Performance deficits (please support below)    Moderate / High Support Documentation:    Physical:  Edema    Cognitive:  No Deficits    Psychosocial:    No Deficits     Treatment Options [] LOW: Limited options  [] MODERATE: Several options  [x] HIGH: Multiple options      Decision Making/ Complexity Score: low       The following goals were discussed with the patient and patient is in agreement with them as to be addressed in the treatment plan.     Goals:     Short Term Goals: (2 weeks)  Goals: Progressing / MET   1. Patient will demonstrate 100% knowledge of lymphedema precautions and signs of infection to allow for reduced lymphedema risk, infection risk, and/or exacerbation of condition.  NOT MET   2. Patient will tolerate daily activities wearing multilayered bandaging to allow for lymphatic drainage support, skin elasticity, and reduction in shape and size of limb.  NOT MET   3. Patient and/or caregiver will order/obtain appropriate compression garments to maintain lymphatic and venous support.  NOT MET   4. Patient will show decreased total girth measurement in LLE by up to 0.5 cm to allow for lower extremity symmetry, shoe and clothing choice, and ability to apply needed compression. NOT MET     Long Term Goals: (4 weeks)  Goals: Progressing / MET   1. Patient and/or caregiver to lukas/doff compression garment independently and with daily compliance to assist in lymphedema management, skin elasticity, and tissue density NOT MET   2. Patient and/or caregiver will be independent in use of pneumatic compression pump or self manual lymphatic drainage techniques to areas within reach to enhance lymphatic drainage and skin condition.  NOT MET   3. Patient to be independent and compliant with home exercise program to allow for increased function in affected limb. NOT MET   4. Patient will show decreased total girth measurement in LLE by up to 1.0 cm  to allow for lower extremity  symmetry, shoe and clothing choice, and ability to apply needed compression daily. NOT MET        Plan     Plan of Care Certification: 1/8/2025 to 4/1/2025.     Therapy Track: COMPLETE DECONGESTIVE THERAPY  Interventions: manual lymphatic drainage, multi-layer bandaging, compression garments, therapeutic exercise, self bandaging and manual lymphatic drainage training, home exercise programming.      Outpatient Occupational Therapy 2 times weekly for 4 weeks to include the following interventions: Manual therapy/joint mobilizations, Therapeutic exercises/activities., and Edema Control.    Latasha Marin, OT, CLWT      I CERTIFY THE NEED FOR THESE SERVICES FURNISHED UNDER THIS PLAN OF TREATMENT AND WHILE UNDER MY CARE   Physician's comments:     Physician's Signature: ___________________________________________________

## 2025-01-21 ENCOUNTER — PATIENT MESSAGE (OUTPATIENT)
Dept: REHABILITATION | Facility: HOSPITAL | Age: 25
End: 2025-01-21
Payer: MEDICAID

## 2025-01-28 ENCOUNTER — CLINICAL SUPPORT (OUTPATIENT)
Dept: REHABILITATION | Facility: HOSPITAL | Age: 25
End: 2025-01-28
Payer: MEDICAID

## 2025-01-28 DIAGNOSIS — I89.0 LYMPHEDEMA OF BOTH LOWER EXTREMITIES: Primary | ICD-10-CM

## 2025-01-28 PROCEDURE — 97530 THERAPEUTIC ACTIVITIES: CPT

## 2025-01-31 ENCOUNTER — CLINICAL SUPPORT (OUTPATIENT)
Dept: REHABILITATION | Facility: HOSPITAL | Age: 25
End: 2025-01-31
Payer: MEDICAID

## 2025-01-31 DIAGNOSIS — I89.0 LYMPHEDEMA OF BOTH LOWER EXTREMITIES: Primary | ICD-10-CM

## 2025-01-31 PROCEDURE — 97530 THERAPEUTIC ACTIVITIES: CPT

## 2025-01-31 NOTE — PROGRESS NOTES
[facility]  Occupational Therapy Treatment Note  Lymphedema    Date: 1/28/2025  Name: Tawnya Sanz  Clinic Number: [id number]    Therapy Diagnosis:   Encounter Diagnosis   Name Primary?    Lymphedema of both lower extremities Yes     Physician: Bentley Pittman MD*    Physician Orders: Eval and Treat  Medical Diagnosis: I89.0 (ICD-10-CM) - Lymphedema of both lower extremities   Evaluation Date: 1/8/2025  Insurance Authorization Period Expiration: 4/1/2025  Plan of Care Certification Period: 4/1/2025  Visit # / Visits authorized: 1 / 8  FOTO: see media, 1 / 3     Precautions:  Standard    Time In: 4:25  Time Out: 5:25  Total Billable Time: 60 minutes    SUBJECTIVE     Pt reports: she has been wearing knee high stockings daily but they feel too lose in foot. Swelling persists in digits/ankle.  Tawnya reported wearing compression outside of therapy visits: Yes   She was compliant with home exercise program given last session.   Response to previous treatment:N/A  Functional change: none    Pain: did not quantify       OBJECTIVE     Pt arrived in NAD    Compression garment status: 20-30 mm hg knee highs  Compression Rx status: requested from MD  Pneumatic pump status: referred by MD, not yet received    Objective Measures updated at progress report unless specified.    Treatment     Tawnya received the treatments listed below:       Therapeutic activity techniques were applied for 60 minutes, including:    SEQUENTIAL COMPRESSION PUMP: full leg sleeve applied to LLE  Airos 6 with default setting with distal pressures starting at 45mmHg entire extremity simultaneous to education.     MULTILAYERED BANDAGING:  issued supplies and bandaged LLE  base of toes to below knee with elastomull to digits, cotton stockinette, cellona padding, 1 6 cm, 1 8 cm, and 1 10 cm Rosidal K bandages to leave intact 24-72 hrs as tolerated, discontinue with any problems, return rolled bandages next  session. Wash and wear schedules confirmed.      Patient was educated in   Compression needs: 20-30 mm hg knee highs and Toe caps  Home management plan:  Cost/coverage of compression garments: Medicare now pays for compression. Private insurance coverage is on a case-by-case basis. In order to determine coverage an Rx with a diagnosis of lymphedema is sent to a participating DME for a benefits check.   Commitment to attendance and securing compression needs are critical to lymphedema management  Pneumatic pump benefits, set-up, use, referral process, coverage through insurance. Coverage is based on insurance plan as well as if deductible has been met. Pneumatic pump is an added tool for managing lymphedema at home and is meant to be used in addition to wearing compression garments daily but does not substitute compression garments.     Demonstrated and practiced self bandaging of LLE x 1 repetitions      Patient Education and Home Exercises      Education provided:   - see above  - Progress towards goals     Written Home Exercises Provided: no       Assessment     Pt would continue to benefit from skilled OT.      Tawnya is progressing well towards her goals and there are no updates to goals at this time. Pt prognosis is Good.     Pt will continue to benefit from skilled outpatient occupational therapy to address the deficits listed in the problem list on initial evaluation provide pt/family education and to maximize pt's level of independence in the home and community environment.     Pt's spiritual, cultural and educational needs considered and pt agreeable to plan of care and goals.    Anticipated barriers to occupational therapy: none    Goals:  Short Term Goals: (2 weeks)  Goals: Progressing / MET   1. Patient will demonstrate 100% knowledge of lymphedema precautions and signs of infection to allow for reduced lymphedema risk, infection risk, and/or exacerbation of condition.  NOT MET   2. Patient will  tolerate daily activities wearing multilayered bandaging to allow for lymphatic drainage support, skin elasticity, and reduction in shape and size of limb.  NOT MET   3. Patient and/or caregiver will order/obtain appropriate compression garments to maintain lymphatic and venous support.  NOT MET   4. Patient will show decreased total girth measurement in LLE by up to 0.5 cm to allow for lower extremity symmetry, shoe and clothing choice, and ability to apply needed compression. NOT MET      Long Term Goals: (4 weeks)  Goals: Progressing / MET   1. Patient and/or caregiver to lukas/doff compression garment independently and with daily compliance to assist in lymphedema management, skin elasticity, and tissue density NOT MET   2. Patient and/or caregiver will be independent in use of pneumatic compression pump or self manual lymphatic drainage techniques to areas within reach to enhance lymphatic drainage and skin condition.  NOT MET   3. Patient to be independent and compliant with home exercise program to allow for increased function in affected limb. NOT MET   4. Patient will show decreased total girth measurement in LLE by up to 1.0 cm  to allow for lower extremity symmetry, shoe and clothing choice, and ability to apply needed compression daily. NOT MET          PLAN     Updates/Grading for next session: none    Latasha Marin, OT, CLWT

## 2025-01-31 NOTE — PROGRESS NOTES
[facility]  Occupational Therapy Treatment Note  Lymphedema     Date: 1/28/2025  Name: Tawnya Sanz  Clinic Number: [id number]     Therapy Diagnosis:        Encounter Diagnosis   Name Primary?    Lymphedema of both lower extremities Yes      Physician: Bentley Pittman MD*     Physician Orders: Eval and Treat  Medical Diagnosis: I89.0 (ICD-10-CM) - Lymphedema of both lower extremities   Evaluation Date: 1/8/2025  Insurance Authorization Period Expiration: 4/1/2025  Plan of Care Certification Period: 4/1/2025  Visit # / Visits authorized: 2 / 8  FOTO: see media, 1 / 3     Precautions:  Standard     Time In: 11:05  Time Out: 12:00  Total Billable Time: 55 minutes     SUBJECTIVE      Pt reports: Tolerated wrap well however it started to unravel around 36 hours. She did not notice a reduction in edema after removing.   Tawnya reported wearing compression outside of therapy visits: Yes   She was compliant with home exercise program given last session.   Response to previous treatment: no notable change  Functional change: none     Pain: did not quantify         OBJECTIVE      Pt arrived in NAD     Compression garment status: 20-30 mm hg knee highs  Compression Rx status: requested from MD  Pneumatic pump status: referred by MD, not yet received     Objective Measures updated at progress report unless specified.     Treatment      Tawnya received the treatments listed below:         Therapeutic activity techniques were applied for 60 minutes, including:     SEQUENTIAL COMPRESSION PUMP: full leg sleeve applied to LLE  Airos 6 with default setting with distal pressures starting at 45mmHg entire extremity simultaneous to education.     MANUAL LYMPHATIC DRAINAGE (MLD):    While supine, stimulation at terminus, abdominal, inguinal, and popliteal lymph nodes, drainage of LLE targeting dorsum of foot and digits with return proximally.       MULTILAYERED BANDAGING:  issued supplies  and bandaged LLE  base of toes to above ankle with elastomull to digits, edema wear stockinette, cellona padding, 1 6 cm, 1 8 cm, Rosidal K bandages to leave intact 24-72 hrs as tolerated, discontinue with any problems, return rolled bandages next session. Wash and wear schedules confirmed.       Patient was educated in   Compression needs: 20-30 mm hg knee highs and Toe caps  Home management plan:  Cost/coverage of compression garments: Medicare now pays for compression. Private insurance coverage is on a case-by-case basis. In order to determine coverage an Rx with a diagnosis of lymphedema is sent to a participating DME for a benefits check.   Commitment to attendance and securing compression needs are critical to lymphedema management  Pneumatic pump benefits, set-up, use, referral process, coverage through insurance. Coverage is based on insurance plan as well as if deductible has been met. Pneumatic pump is an added tool for managing lymphedema at home and is meant to be used in addition to wearing compression garments daily but does not substitute compression garments.   Self MANUAL LYMPHATIC DRAINAGE techniques.             Patient Education and Home Exercises       Education provided:   - see above  - Progress towards goals      Written Home Exercises Provided: no        Assessment      Issued 15-20 mm hg toe cap to trial until next session and assess reductions. Discussed benefit of purchasing 20-30 mm hg toe cap or inelastic Velcro wrap digit garments for maximum reductions.     Pt would continue to benefit from skilled OT.       Tawnya is progressing well towards her goals and there are no updates to goals at this time. Pt prognosis is Good.      Pt will continue to benefit from skilled outpatient occupational therapy to address the deficits listed in the problem list on initial evaluation provide pt/family education and to maximize pt's level of independence in the home and community environment.       Pt's spiritual, cultural and educational needs considered and pt agreeable to plan of care and goals.     Anticipated barriers to occupational therapy: none     Goals:  Short Term Goals: (2 weeks)  Goals: Progressing / MET   1. Patient will demonstrate 100% knowledge of lymphedema precautions and signs of infection to allow for reduced lymphedema risk, infection risk, and/or exacerbation of condition.  NOT MET   2. Patient will tolerate daily activities wearing multilayered bandaging to allow for lymphatic drainage support, skin elasticity, and reduction in shape and size of limb.  NOT MET   3. Patient and/or caregiver will order/obtain appropriate compression garments to maintain lymphatic and venous support.  NOT MET   4. Patient will show decreased total girth measurement in LLE by up to 0.5 cm to allow for lower extremity symmetry, shoe and clothing choice, and ability to apply needed compression. NOT MET      Long Term Goals: (4 weeks)  Goals: Progressing / MET   1. Patient and/or caregiver to lukas/doff compression garment independently and with daily compliance to assist in lymphedema management, skin elasticity, and tissue density NOT MET   2. Patient and/or caregiver will be independent in use of pneumatic compression pump or self manual lymphatic drainage techniques to areas within reach to enhance lymphatic drainage and skin condition.  NOT MET   3. Patient to be independent and compliant with home exercise program to allow for increased function in affected limb. NOT MET   4. Patient will show decreased total girth measurement in LLE by up to 1.0 cm  to allow for lower extremity symmetry, shoe and clothing choice, and ability to apply needed compression daily. NOT MET            PLAN      Updates/Grading for next session: none     Latasha Marin, OT, CLWT

## 2025-02-03 ENCOUNTER — TELEPHONE (OUTPATIENT)
Dept: CARDIOLOGY | Facility: CLINIC | Age: 25
End: 2025-02-03
Payer: MEDICAID

## 2025-02-03 ENCOUNTER — CLINICAL SUPPORT (OUTPATIENT)
Dept: REHABILITATION | Facility: HOSPITAL | Age: 25
End: 2025-02-03
Payer: MEDICAID

## 2025-02-03 DIAGNOSIS — I89.0 LYMPHEDEMA OF BOTH LOWER EXTREMITIES: Primary | ICD-10-CM

## 2025-02-03 PROCEDURE — 97530 THERAPEUTIC ACTIVITIES: CPT

## 2025-02-03 NOTE — PROGRESS NOTES
[facility]  Occupational Therapy Treatment Note  Lymphedema     Date: 1/28/2025  Name: Tawnya Sanz  Clinic Number: [id number]     Therapy Diagnosis:        Encounter Diagnosis   Name Primary?    Lymphedema of both lower extremities Yes      Physician: Bentley Pittman MD*     Physician Orders: Eval and Treat  Medical Diagnosis: I89.0 (ICD-10-CM) - Lymphedema of both lower extremities   Evaluation Date: 1/8/2025  Insurance Authorization Period Expiration: 4/1/2025  Plan of Care Certification Period: 4/1/2025  Visit # / Visits authorized: 3 / 8  FOTO: see media, 1 / 3     Precautions:  Standard     Time In: 3:30  Time Out: 4:25  Total Billable Time: 55 minutes     SUBJECTIVE      Pt reports: Swelling appears to be reducing   Tawnya reported wearing compression outside of therapy visits: Yes   She was compliant with home exercise program given last session.   Response to previous treatment: no notable change  Functional change: none     Pain: did not quantify         OBJECTIVE      Pt arrived in NAD     Compression garment status: 20-30 mm hg knee highs  Compression Rx status: requested from MD  Pneumatic pump status: referred by MD, not yet received     LE Girth Measurements: taken in supine  LANDMARK RIGHT LE  1/8/25 LEFT LE  1/8/25 LANDMARK RIGHT LE  2/3/25 LEFT LE  2/23/25   SBP - -  - -   10 below SBP 51.0 cm 51.0 cm Digit 5 DIP 5.5 cm 5.5 cm   20 below SBP 48.0 cm 48.0 cm Digit 4 DIP 5.5 cm 5.5 cm   30 below SBP 37.0 cm 37.0 cm Digit 3 DIP 5.5 cm 5.5 cm   35 below SBP 29.0 cm 29.0 cm Digit 2 DIP 6.0 cm 6.5 cm   Ankle 28.0 cm 29.0 cm Digit 1 DIP 9.25 cm 9.5 cm   Forefoot 26.0 cm 27.0 cm Forefoot 25.5 cm 26.5 cm          Treatment      Tawnya received the treatments listed below:         Therapeutic activity techniques were applied for 55 minutes, including:       MANUAL LYMPHATIC DRAINAGE (MLD):    While supine, stimulation at terminus, abdominal, inguinal, and  popliteal lymph nodes, drainage of LLE targeting dorsum of foot and digits with return proximally.       MULTILAYERED BANDAGING:  issued supplies and bandaged LLE  base of toes to above ankle with elastomull to digits, edema wear stockinette, cellona padding, 1 6 cm, 1 8 cm, Rosidal K bandages to leave intact 24-72 hrs as tolerated, discontinue with any problems, return rolled bandages next session. Wash and wear schedules confirmed.       Patient was educated in   Compression needs: 20-30 mm hg knee highs and Toe caps  Home management plan:  Cost/coverage of compression garments: Medicare now pays for compression. Private insurance coverage is on a case-by-case basis. In order to determine coverage an Rx with a diagnosis of lymphedema is sent to a participating DME for a benefits check.   Commitment to attendance and securing compression needs are critical to lymphedema management  Pneumatic pump benefits, set-up, use, referral process, coverage through insurance. Coverage is based on insurance plan as well as if deductible has been met. Pneumatic pump is an added tool for managing lymphedema at home and is meant to be used in addition to wearing compression garments daily but does not substitute compression garments.   Self MANUAL LYMPHATIC DRAINAGE techniques.             Patient Education and Home Exercises       Education provided:   - see above  - Progress towards goals      Written Home Exercises Provided: no        Assessment      Edema appears to be reducing with toe caps.     Pt would continue to benefit from skilled OT.       Tawnya is progressing well towards her goals and there are no updates to goals at this time. Pt prognosis is Good.      Pt will continue to benefit from skilled outpatient occupational therapy to address the deficits listed in the problem list on initial evaluation provide pt/family education and to maximize pt's level of independence in the home and community environment.       Pt's spiritual, cultural and educational needs considered and pt agreeable to plan of care and goals.     Anticipated barriers to occupational therapy: none     Goals:  Short Term Goals: (2 weeks)  Goals: Progressing / MET   1. Patient will demonstrate 100% knowledge of lymphedema precautions and signs of infection to allow for reduced lymphedema risk, infection risk, and/or exacerbation of condition.  NOT MET   2. Patient will tolerate daily activities wearing multilayered bandaging to allow for lymphatic drainage support, skin elasticity, and reduction in shape and size of limb.  NOT MET   3. Patient and/or caregiver will order/obtain appropriate compression garments to maintain lymphatic and venous support.  NOT MET   4. Patient will show decreased total girth measurement in LLE by up to 0.5 cm to allow for lower extremity symmetry, shoe and clothing choice, and ability to apply needed compression. NOT MET      Long Term Goals: (4 weeks)  Goals: Progressing / MET   1. Patient and/or caregiver to lukas/doff compression garment independently and with daily compliance to assist in lymphedema management, skin elasticity, and tissue density NOT MET   2. Patient and/or caregiver will be independent in use of pneumatic compression pump or self manual lymphatic drainage techniques to areas within reach to enhance lymphatic drainage and skin condition.  NOT MET   3. Patient to be independent and compliant with home exercise program to allow for increased function in affected limb. NOT MET   4. Patient will show decreased total girth measurement in LLE by up to 1.0 cm  to allow for lower extremity symmetry, shoe and clothing choice, and ability to apply needed compression daily. NOT MET            PLAN      Updates/Grading for next session: none     Latasha Marin, OT, CLWT

## 2025-02-05 ENCOUNTER — CLINICAL SUPPORT (OUTPATIENT)
Dept: REHABILITATION | Facility: HOSPITAL | Age: 25
End: 2025-02-05
Payer: MEDICAID

## 2025-02-05 DIAGNOSIS — I89.0 LYMPHEDEMA OF BOTH LOWER EXTREMITIES: Primary | ICD-10-CM

## 2025-02-05 PROCEDURE — 97530 THERAPEUTIC ACTIVITIES: CPT

## 2025-02-06 NOTE — PROGRESS NOTES
[facility] OUTPATIENT THERAPY AND WELLNESS  Occupational Therapy Treatment Note  Lymphedema     Date: 1/28/2025  Name: Tawnya Sanz  Clinic Number: [id number]     Therapy Diagnosis:        Encounter Diagnosis   Name Primary?    Lymphedema of both lower extremities Yes      Physician: Bentley Pittman MD*     Physician Orders: Eval and Treat  Medical Diagnosis: I89.0 (ICD-10-CM) - Lymphedema of both lower extremities   Evaluation Date: 1/8/2025  Insurance Authorization Period Expiration: 4/1/2025  Plan of Care Certification Period: 4/1/2025  Visit # / Visits authorized: 4 / 8  FOTO: see media, 1 / 3     Precautions:  Standard     Time In: 3:35  Time Out: 4:30  Total Billable Time: 55 minutes     SUBJECTIVE      Pt reports: No significant reductions after last session. Skin along forefoot and digits became irritated. She has a hx of eczema and suspects that could be contributing. Toe caps were comfortable but she is interested in purchasing higher compression garment.  Tawnya reported wearing compression outside of therapy visits: Yes   She was compliant with home exercise program given last session.   Response to previous treatment: no notable change  Functional change: none     Pain: did not quantify         OBJECTIVE      Pt arrived in NAD     Compression garment status: 20-30 mm hg knee highs  Compression Rx status: requested from MD  Pneumatic pump status: referred by MD, not yet received     LE Girth Measurements: taken in supine  LANDMARK RIGHT LE  1/8/25 LEFT LE  1/8/25 LANDMARK RIGHT LE  2/3/25 LEFT LE  2/3/25   SBP - -  - -   10 below SBP 51.0 cm 51.0 cm Digit 5 DIP 5.5 cm 5.5 cm   20 below SBP 48.0 cm 48.0 cm Digit 4 DIP 5.5 cm 5.5 cm   30 below SBP 37.0 cm 37.0 cm Digit 3 DIP 5.5 cm 5.5 cm   35 below SBP 29.0 cm 29.0 cm Digit 2 DIP 6.0 cm 6.5 cm   Ankle 28.0 cm 29.0 cm Digit 1 DIP 9.25 cm 9.5 cm   Forefoot 26.0 cm 27.0 cm Forefoot 25.5 cm 26.5 cm       Digit measured post pump treatment. No  reductions in circumferential measurements.      Treatment      Tawnya received the treatments listed below:         Therapeutic activity techniques were applied for 55 minutes, including:       SEQUENTIAL COMPRESSION PUMP: full leg sleeve applied to LLE  Airos 6 with default setting with distal pressures starting at 45mmHg entire extremity simultaneous to education.      MULTILAYERED BANDAGING:  issued supplies and bandaged LLE  base of toes to above ankle with elastomull to digits, cotton stockinette, cellona padding, 1 6 cm, 1 8 cm, Rosidal K bandages to leave intact 24-72 hrs as tolerated, discontinue with any problems, return rolled bandages next session. Wash and wear schedules confirmed.       Patient was educated in   Compression needs: 20-30 mm hg knee highs and Toe caps  Home management plan:  Cost/coverage of compression garments: Medicare now pays for compression. Private insurance coverage is on a case-by-case basis. In order to determine coverage an Rx with a diagnosis of lymphedema is sent to a participating DME for a benefits check.   Commitment to attendance and securing compression needs are critical to lymphedema management  Pneumatic pump benefits, set-up, use, referral process, coverage through insurance. Coverage is based on insurance plan as well as if deductible has been met. Pneumatic pump is an added tool for managing lymphedema at home and is meant to be used in addition to wearing compression garments daily but does not substitute compression garments.   Self MANUAL LYMPHATIC DRAINAGE techniques.             Patient Education and Home Exercises       Education provided:   - see above  - Progress towards goals      Written Home Exercises Provided: no        Assessment      Digit measured post pump treatment. No reductions in circumferential measurements. Provided product information for ReadyWrap Toe garment to self purchase and assess reductions/containment. Pt to self measure after  removing bandages at home.     Pt would continue to benefit from skilled OT.       Tawnya is progressing well towards her goals and there are no updates to goals at this time. Pt prognosis is Good.      Pt will continue to benefit from skilled outpatient occupational therapy to address the deficits listed in the problem list on initial evaluation provide pt/family education and to maximize pt's level of independence in the home and community environment.      Pt's spiritual, cultural and educational needs considered and pt agreeable to plan of care and goals.     Anticipated barriers to occupational therapy: none     Goals:  Short Term Goals: (2 weeks)  Goals: Progressing / MET   1. Patient will demonstrate 100% knowledge of lymphedema precautions and signs of infection to allow for reduced lymphedema risk, infection risk, and/or exacerbation of condition.  NOT MET   2. Patient will tolerate daily activities wearing multilayered bandaging to allow for lymphatic drainage support, skin elasticity, and reduction in shape and size of limb.  NOT MET   3. Patient and/or caregiver will order/obtain appropriate compression garments to maintain lymphatic and venous support.  NOT MET   4. Patient will show decreased total girth measurement in LLE by up to 0.5 cm to allow for lower extremity symmetry, shoe and clothing choice, and ability to apply needed compression. NOT MET      Long Term Goals: (4 weeks)  Goals: Progressing / MET   1. Patient and/or caregiver to lukas/doff compression garment independently and with daily compliance to assist in lymphedema management, skin elasticity, and tissue density NOT MET   2. Patient and/or caregiver will be independent in use of pneumatic compression pump or self manual lymphatic drainage techniques to areas within reach to enhance lymphatic drainage and skin condition.  NOT MET   3. Patient to be independent and compliant with home exercise program to allow for increased function  in affected limb. NOT MET   4. Patient will show decreased total girth measurement in LLE by up to 1.0 cm  to allow for lower extremity symmetry, shoe and clothing choice, and ability to apply needed compression daily. NOT MET            PLAN      Updates/Grading for next session: none     Latasha Marin, OT, CLWT

## 2025-02-10 ENCOUNTER — CLINICAL SUPPORT (OUTPATIENT)
Dept: REHABILITATION | Facility: HOSPITAL | Age: 25
End: 2025-02-10
Payer: MEDICAID

## 2025-02-10 DIAGNOSIS — I89.0 LYMPHEDEMA OF BOTH LOWER EXTREMITIES: Primary | ICD-10-CM

## 2025-02-10 PROCEDURE — 97530 THERAPEUTIC ACTIVITIES: CPT

## 2025-02-11 ENCOUNTER — OFFICE VISIT (OUTPATIENT)
Dept: RHEUMATOLOGY | Facility: CLINIC | Age: 25
End: 2025-02-11
Payer: MEDICAID

## 2025-02-11 ENCOUNTER — HOSPITAL ENCOUNTER (OUTPATIENT)
Dept: RADIOLOGY | Facility: HOSPITAL | Age: 25
Discharge: HOME | End: 2025-02-11
Attending: STUDENT IN AN ORGANIZED HEALTH CARE EDUCATION/TRAINING PROGRAM
Payer: MEDICAID

## 2025-02-11 VITALS
DIASTOLIC BLOOD PRESSURE: 68 MMHG | BODY MASS INDEX: 47.09 KG/M2 | HEIGHT: 66 IN | HEART RATE: 80 BPM | WEIGHT: 293 LBS | SYSTOLIC BLOOD PRESSURE: 118 MMHG

## 2025-02-11 DIAGNOSIS — M25.50 ARTHRALGIA, UNSPECIFIED JOINT: ICD-10-CM

## 2025-02-11 PROCEDURE — 99999 PR PBB SHADOW E&M-EST. PATIENT-LVL III: CPT | Mod: PBBFAC,,, | Performed by: STUDENT IN AN ORGANIZED HEALTH CARE EDUCATION/TRAINING PROGRAM

## 2025-02-11 PROCEDURE — 99204 OFFICE O/P NEW MOD 45 MIN: CPT | Mod: S$PBB,,, | Performed by: STUDENT IN AN ORGANIZED HEALTH CARE EDUCATION/TRAINING PROGRAM

## 2025-02-11 PROCEDURE — 1159F MED LIST DOCD IN RCRD: CPT | Mod: CPTII,,, | Performed by: STUDENT IN AN ORGANIZED HEALTH CARE EDUCATION/TRAINING PROGRAM

## 2025-02-11 PROCEDURE — 3008F BODY MASS INDEX DOCD: CPT | Mod: CPTII,,, | Performed by: STUDENT IN AN ORGANIZED HEALTH CARE EDUCATION/TRAINING PROGRAM

## 2025-02-11 PROCEDURE — 73130 X-RAY EXAM OF HAND: CPT | Mod: 26,50,, | Performed by: RADIOLOGY

## 2025-02-11 PROCEDURE — 3074F SYST BP LT 130 MM HG: CPT | Mod: CPTII,,, | Performed by: STUDENT IN AN ORGANIZED HEALTH CARE EDUCATION/TRAINING PROGRAM

## 2025-02-11 PROCEDURE — 3078F DIAST BP <80 MM HG: CPT | Mod: CPTII,,, | Performed by: STUDENT IN AN ORGANIZED HEALTH CARE EDUCATION/TRAINING PROGRAM

## 2025-02-11 PROCEDURE — 73130 X-RAY EXAM OF HAND: CPT | Mod: TC,50,FY,PO

## 2025-02-11 PROCEDURE — 99213 OFFICE O/P EST LOW 20 MIN: CPT | Mod: PBBFAC,PN | Performed by: STUDENT IN AN ORGANIZED HEALTH CARE EDUCATION/TRAINING PROGRAM

## 2025-02-11 NOTE — PROGRESS NOTES
RHEUMATOLOGY OUTPATIENT CLINIC NOTE    2/11/2025    Attending Rheumatologist: Bailey Martinez  Primary Care Provider: Jennifer Rao MD   Physician Requesting Consultation: Shawnee Valenzuela, PA-C  [id number] [place] [address]  Chief Complaint/Reason For Consultation:  Joint Pain      Subjective:       HPI  Tawnya Sanz is a 24 y.o. White female who comes for evaluation of joint pain.     She has been having swelling in the top of the left foot for about 3 years ago. She reports that one time she developed symptoms like celullitis and she was prescribed steroids which helped. She saw rheumatologist in 2023 while in [place] and they were monitoring for PsA but she reports that rheum was not worried about it. She reports that she had MRI of the left foot in [place] that showed plantar fasciitis and soft tissue swelling, this was done after she had steroids.   She denies any dactylitis, tendinitis.   She has a history of eczema in top the foot. Has seen dermatology in the past. Was prescribed triamcinolone. No history of psoriasis.   She is doing lymphedema treatment now.   She is worried about bilateral 2nd DIPs turning inwards. No pain or swelling in fingers. 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   She reports low back pain, no nocturnal awakening, worse with prolonged standing, better with rest. No radiculopathy.   She denies any abdominal pain, hematochezia, eye inflammation    Aunt with psoriasis. 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    Chronic sinusitis, unspecified     Obesity,  unspecified      Past Surgical History:   Procedure Laterality Date    WISDOM TOOTH EXTRACTION       Family History   Problem Relation Name Age of Onset    Cancer Mother          thyroid    Hypertension Mother      Heart failure Mother      Diabetes Mother      Cancer Father          skin - unsure type     Social History     Substance and Sexual Activity   Alcohol Use Yes    Comment: socially 1 x month     Social History     Tobacco Use   Smoking Status Never   Smokeless Tobacco Never     Social History     Substance and Sexual Activity   Drug Use Never       Current Outpatient Medications:     ciclopirox (PENLAC) 8 % Soln, Apply over nail and surrounding skin NIGHTLY. Apply daily over previous coat. After seven (7) days, may remove with alcohol and continue cycle., Disp: 6.6 mL, Rfl: 11    clindamycin phosphate (CLINDAGEL) 1 % glqd, Apply 1 Application topically daily, Disp: 75 mL, Rfl: 3    fluticasone propionate (FLONASE) 50 mcg/actuation nasal spray, 2 sprays by Nasal route daily, Disp: 16 g, Rfl: 11    ketoconazole (NIZORAL) 2 % cream, Apply twice a day for two to three weeks until scaling has resolved. After resolved, continue treating for additional week., Disp: 30 g, Rfl: 3    medroxyPROGESTERone (PROVERA) 10 MG tablet, Take 1 tablet (10 mg total) by mouth once daily. (Patient taking differently: Take 10 mg by mouth as needed.), Disp: 10 tablet, Rfl: 0    propylene glycoL 0.6 % Drop, Apply to eye as needed., Disp: , Rfl:     terbinafine HCL (LAMISIL) 1 % cream, Apply topically 2 (two) times daily., Disp: 28.4 g, Rfl: 0    triamcinolone acetonide 0.1% (KENALOG) 0.1 % cream, Apply twice a day as needed for itchy raised skin.  Don't use on face, armpits, or groin., Disp: 454 g, Rfl: 2     Objective:         Vitals:    02/11/25 1308   BP: 118/68   Pulse: 80     Physical Exam   Constitutional: She is oriented to person, place, and time. She appears well-developed.   HENT:   Head: Normocephalic.   Pulmonary/Chest:  "Effort normal.   Musculoskeletal:      Cervical back: Neck supple.      Comments: Wrists: FROM; no synovitis;    MCPs: FROM; no synovitis;   PIPs:FROM; no synovitis;   DIPs: FROM; no synovitis;   HIPS: FROM  Ankles: FROM: no synovitis   Toes: no tenderness on palpation. Mild soft tissue swelling in dorsum of left foot      Neurological: She is alert and oriented to person, place, and time.   Skin: No rash noted.   Vitals reviewed.      Reviewed old and all outside pertinent medical records available.    All lab results personally reviewed and interpreted by me.  Lab Results   Component Value Date    WBC 10.14 09/25/2024    HGB 12.7 09/25/2024    HCT 39.2 09/25/2024    MCV 87 09/25/2024    MCH 28.1 09/25/2024    MCHC 32.4 09/25/2024    RDW 12.7 09/25/2024     09/25/2024    MPV 10.9 09/25/2024       Lab Results   Component Value Date     09/25/2024    K 3.7 09/25/2024     09/25/2024    CO2 24 09/25/2024    GLU 95 09/25/2024    BUN 12 09/25/2024    CALCIUM 10.1 09/25/2024    PROT 6.9 10/22/2024    ALBUMIN 3.7 10/22/2024    BILITOT 0.5 10/22/2024    AST 14 10/22/2024    ALKPHOS 58 10/22/2024    ALT 22 10/22/2024       No results found for: "COLORU", "APPEARANCEUA", "SPECGRAV", "PHUR", "PHUA", "PROTEINUA", "GLUCOSEU", "KETONESU", "BLOODU", "LEUKOCYTESUR", "NITRITE", "UROBILINOGEN"    Lab Results   Component Value Date    CRP 4.6 09/25/2024       Lab Results   Component Value Date    RF <13.0 09/25/2024    SEDRATE 18 09/25/2024    CCPANTIBODIE 5.5 (H) 09/25/2024       No components found for: "25OHVITDTOT", "56JSWOUG4", "86XBQVWR0", "METHODNOTE"    No results found for: "URICACID"    Lab Results   Component Value Date    HEPBSAB 47.69 10/22/2024    HEPBSAB Reactive 10/22/2024    HEPBSAG Non-reactive 10/22/2024    HEPCAB Non-reactive 10/22/2024     Imaging:  All imaging reviewed and independently interpreted by me.     ASSESSMENT / PLAN:     Tawnya Sanz is a 24 y.o. White female with:    1. " Arthralgia, unspecified joint  -had one episode of what appears to have been inflammatory arthritis involving midfoot and possibly dactylitis in toes about 3 years ago, that resolved with steroids. She was evaluated by rheumatologist in [place] who wanted to monitor for PsA. Family history of psoriasis. Has some eczema in feet.   -she has not had any recurrence of same symptoms. Still has soft tissue swelling in feet but not painful.   -RF negative. CCP borderline which can be seen in PsA. I have discussed this with patient   -she is unsure if she had HLAB27 testing done in [place] but she will check. I will place the order just in case she has not   -I do not see any abnormalities in hands, no synovitis. Will get baseline hand XR.   -I think we need to monitor for development of PsA.     Follow up in about 6 months (around 8/11/2025). Or PRN    Method of contact with patient concerns: [facility]    Disclaimer:  This note is prepared using voice recognition software and as such is likely to have errors and has not been proof read. Please contact me for questions.     Time spent: 45 minutes in face to face discussion concerning diagnosis, prognosis, review of lab and test results, benefits of treatment as well as management of disease, counseling of patient and coordination of care between various health care providers.      Bailey Martinez M.D.  Rheumatology  [facility]

## 2025-02-12 ENCOUNTER — CLINICAL SUPPORT (OUTPATIENT)
Dept: REHABILITATION | Facility: HOSPITAL | Age: 25
End: 2025-02-12
Payer: MEDICAID

## 2025-02-12 DIAGNOSIS — I89.0 LYMPHEDEMA OF BOTH LOWER EXTREMITIES: Primary | ICD-10-CM

## 2025-02-12 PROCEDURE — 97530 THERAPEUTIC ACTIVITIES: CPT

## 2025-02-13 NOTE — PROGRESS NOTES
[facility]  Occupational Therapy Treatment Note  Lymphedema     Date: 1/28/2025  Name: Tawnya Sanz  Clinic Number: [id number]     Therapy Diagnosis:        Encounter Diagnosis   Name Primary?    Lymphedema of both lower extremities Yes      Physician: Bentley Pittman MD*     Physician Orders: Eval and Treat  Medical Diagnosis: I89.0 (ICD-10-CM) - Lymphedema of both lower extremities   Evaluation Date: 1/8/2025  Insurance Authorization Period Expiration: 4/1/2025  Plan of Care Certification Period: 4/1/2025  Visit # / Visits authorized: 5 / 8  FOTO: see media, 1 / 3     Precautions:  Standard     Time In: 3:40  Time Out: 4:30  Total Billable Time: 50 minutes     SUBJECTIVE      Pt reports: No significant changes. Toe Velcro garment has not arrived yet.   Tawnya reported wearing compression outside of therapy visits: Yes   She was compliant with home exercise program given last session.   Response to previous treatment: no notable change  Functional change: none     Pain: did not quantify         OBJECTIVE      Pt arrived in NAD     Compression garment status: 20-30 mm hg knee highs, toe caps  Compression Rx status: requested from MD  Pneumatic pump status: referred by MD, not yet received     LE Girth Measurements: taken in supine  LANDMARK RIGHT LE  1/8/25 LEFT LE  1/8/25 LANDMARK RIGHT LE  2/3/25 LEFT LE  2/3/25   SBP - -  - -   10 below SBP 51.0 cm 51.0 cm Digit 5 DIP 5.5 cm 5.5 cm   20 below SBP 48.0 cm 48.0 cm Digit 4 DIP 5.5 cm 5.5 cm   30 below SBP 37.0 cm 37.0 cm Digit 3 DIP 5.5 cm 5.5 cm   35 below SBP 29.0 cm 29.0 cm Digit 2 DIP 6.0 cm 6.5 cm   Ankle 28.0 cm 29.0 cm Digit 1 DIP 9.25 cm 9.5 cm   Forefoot 26.0 cm 27.0 cm Forefoot 25.5 cm 26.5 cm       Digit measured post pump treatment. No reductions in circumferential measurements.      Treatment      Tawnya received the treatments listed below:         Therapeutic activity techniques were applied for 50  minutes, including:     Lymphatouch device applied to LLE calf to digits via static and then dynamic techniques beginning proximal to distal and returning proximal, to promote lymphatic drainage and decrease dermal thickening.     MULTILAYERED BANDAGING:  issued supplies and bandaged LLE  base of toes to above ankle with elastomull and Komprex 2 to digits, cotton stockinette, cellona padding, 1 8 cm, Rosidal K bandages to leave intact 24-72 hrs as tolerated, discontinue with any problems, return rolled bandages next session. Wash and wear schedules confirmed.       Patient was educated in   Compression needs: 20-30 mm hg knee highs and Toe caps  Home management plan:  Cost/coverage of compression garments: Medicare now pays for compression. Private insurance coverage is on a case-by-case basis. In order to determine coverage an Rx with a diagnosis of lymphedema is sent to a participating DME for a benefits check.   Commitment to attendance and securing compression needs are critical to lymphedema management  Pneumatic pump benefits, set-up, use, referral process, coverage through insurance. Coverage is based on insurance plan as well as if deductible has been met. Pneumatic pump is an added tool for managing lymphedema at home and is meant to be used in addition to wearing compression garments daily but does not substitute compression garments.   Self MANUAL LYMPHATIC DRAINAGE techniques.             Patient Education and Home Exercises       Education provided:   - see above  - Progress towards goals      Written Home Exercises Provided: no        Assessment      Lymphatouch device and Komprex 2 padding along digits included this session to optimize lymphatic drainage and decrease dermal thickening.     Pt would continue to benefit from skilled OT.       Tawnya is progressing well towards her goals and there are no updates to goals at this time. Pt prognosis is Good.      Pt will continue to benefit from  skilled outpatient occupational therapy to address the deficits listed in the problem list on initial evaluation provide pt/family education and to maximize pt's level of independence in the home and community environment.      Pt's spiritual, cultural and educational needs considered and pt agreeable to plan of care and goals.     Anticipated barriers to occupational therapy: none     Goals:  Short Term Goals: (2 weeks)  Goals: Progressing / MET   1. Patient will demonstrate 100% knowledge of lymphedema precautions and signs of infection to allow for reduced lymphedema risk, infection risk, and/or exacerbation of condition.  NOT MET   2. Patient will tolerate daily activities wearing multilayered bandaging to allow for lymphatic drainage support, skin elasticity, and reduction in shape and size of limb.  NOT MET   3. Patient and/or caregiver will order/obtain appropriate compression garments to maintain lymphatic and venous support.  NOT MET   4. Patient will show decreased total girth measurement in LLE by up to 0.5 cm to allow for lower extremity symmetry, shoe and clothing choice, and ability to apply needed compression. NOT MET      Long Term Goals: (4 weeks)  Goals: Progressing / MET   1. Patient and/or caregiver to lukas/doff compression garment independently and with daily compliance to assist in lymphedema management, skin elasticity, and tissue density NOT MET   2. Patient and/or caregiver will be independent in use of pneumatic compression pump or self manual lymphatic drainage techniques to areas within reach to enhance lymphatic drainage and skin condition.  NOT MET   3. Patient to be independent and compliant with home exercise program to allow for increased function in affected limb. NOT MET   4. Patient will show decreased total girth measurement in LLE by up to 1.0 cm  to allow for lower extremity symmetry, shoe and clothing choice, and ability to apply needed compression daily. NOT MET             PLAN      Updates/Grading for next session: none     Latasha Marin, OT, CLWT

## 2025-02-13 NOTE — PROGRESS NOTES
[facility]  Occupational Therapy Treatment Note  Lymphedema     Date: 1/28/2025  Name: Tawnya Sanz  Clinic Number: [id number]     Therapy Diagnosis:        Encounter Diagnosis   Name Primary?    Lymphedema of both lower extremities Yes      Physician: Bentley Pittman MD*     Physician Orders: Eval and Treat  Medical Diagnosis: I89.0 (ICD-10-CM) - Lymphedema of both lower extremities   Evaluation Date: 1/8/2025  Insurance Authorization Period Expiration: 4/1/2025  Plan of Care Certification Period: 4/1/2025  Visit # / Visits authorized: 6 / 8  FOTO: see media, 1 / 3     Precautions:  Standard     Time In: 4:30  Time Out: 5:30  Total Billable Time: 60 minutes     SUBJECTIVE      Pt reports: Toes that were not wrapped had a burning sensation therefore she had to remove gauze/padding. It felt like skin irritation as opposed to circulation issues.   Tawnya reported wearing compression outside of therapy visits: Yes   She was compliant with home exercise program given last session.   Response to previous treatment: no notable change  Functional change: none     Pain: did not quantify         OBJECTIVE      Pt arrived in NAD     Compression garment status: 20-30 mm hg knee highs, toe caps  Compression Rx status: requested from MD  Pneumatic pump status: referred by MD, not yet received     LE Girth Measurements: taken in supine  LANDMARK RIGHT LE  1/8/25 LEFT LE  1/8/25 LANDMARK RIGHT LE  2/3/25 LEFT LE  2/3/25   SBP - -  - -   10 below SBP 51.0 cm 51.0 cm Digit 5 DIP 5.5 cm 5.5 cm   20 below SBP 48.0 cm 48.0 cm Digit 4 DIP 5.5 cm 5.5 cm   30 below SBP 37.0 cm 37.0 cm Digit 3 DIP 5.5 cm 5.5 cm   35 below SBP 29.0 cm 29.0 cm Digit 2 DIP 6.0 cm 6.5 cm   Ankle 28.0 cm 29.0 cm Digit 1 DIP 9.25 cm 9.5 cm   Forefoot 26.0 cm 27.0 cm Forefoot 25.5 cm 26.5 cm       Digit measured post pump treatment. No reductions in circumferential measurements.      Treatment      Tawnya received  the treatments listed below:         Therapeutic activity techniques were applied for 60 minutes, including:     MANUAL LYMPHATIC DRAINAGE (MLD):    While supine, stimulation at terminus, abdominal, inguinal, and popliteal lymph nodes, drainage of entire LLE with return proximally.       MULTILAYERED BANDAGING:  issued supplies and bandaged LLE  with elastomull and Komprex 2 to digits, edema wear stockinette base of toes to above ankle, to leave intact 24-72 hrs as tolerated, discontinue with any problems, return rolled bandages next session. Wash and wear schedules confirmed.       Patient was educated in   Compression needs: 20-30 mm hg knee highs and Toe caps  Home management plan:  Cost/coverage of compression garments: Medicare now pays for compression. Private insurance coverage is on a case-by-case basis. In order to determine coverage an Rx with a diagnosis of lymphedema is sent to a participating DME for a benefits check.   Commitment to attendance and securing compression needs are critical to lymphedema management  Pneumatic pump benefits, set-up, use, referral process, coverage through insurance. Coverage is based on insurance plan as well as if deductible has been met. Pneumatic pump is an added tool for managing lymphedema at home and is meant to be used in addition to wearing compression garments daily but does not substitute compression garments.   Self MANUAL LYMPHATIC DRAINAGE techniques.             Patient Education and Home Exercises       Education provided:   - see above  - Progress towards goals      Written Home Exercises Provided: no        Assessment      Pt to self bandage daily until next session and assess reductions. Discussed potential deficits are irreversible if unresponsive to aggressive compression though wearing compression will prevent progression of disease.     Pt would continue to benefit from skilled OT.       Tawnya is progressing well towards her goals and there are no  updates to goals at this time. Pt prognosis is Good.      Pt will continue to benefit from skilled outpatient occupational therapy to address the deficits listed in the problem list on initial evaluation provide pt/family education and to maximize pt's level of independence in the home and community environment.      Pt's spiritual, cultural and educational needs considered and pt agreeable to plan of care and goals.     Anticipated barriers to occupational therapy: none     Goals:  Short Term Goals: (2 weeks)  Goals: Progressing / MET   1. Patient will demonstrate 100% knowledge of lymphedema precautions and signs of infection to allow for reduced lymphedema risk, infection risk, and/or exacerbation of condition.  NOT MET   2. Patient will tolerate daily activities wearing multilayered bandaging to allow for lymphatic drainage support, skin elasticity, and reduction in shape and size of limb.  NOT MET   3. Patient and/or caregiver will order/obtain appropriate compression garments to maintain lymphatic and venous support.  NOT MET   4. Patient will show decreased total girth measurement in LLE by up to 0.5 cm to allow for lower extremity symmetry, shoe and clothing choice, and ability to apply needed compression. NOT MET      Long Term Goals: (4 weeks)  Goals: Progressing / MET   1. Patient and/or caregiver to lukas/doff compression garment independently and with daily compliance to assist in lymphedema management, skin elasticity, and tissue density NOT MET   2. Patient and/or caregiver will be independent in use of pneumatic compression pump or self manual lymphatic drainage techniques to areas within reach to enhance lymphatic drainage and skin condition.  NOT MET   3. Patient to be independent and compliant with home exercise program to allow for increased function in affected limb. NOT MET   4. Patient will show decreased total girth measurement in LLE by up to 1.0 cm  to allow for lower extremity symmetry,  shoe and clothing choice, and ability to apply needed compression daily. NOT MET            PLAN      Updates/Grading for next session: none     Latasha Marin, OT, CLWT

## 2025-02-14 ENCOUNTER — DOCUMENTATION ONLY (OUTPATIENT)
Dept: CARDIOLOGY | Facility: CLINIC | Age: 25
End: 2025-02-14
Payer: MEDICAID

## 2025-02-14 NOTE — PROGRESS NOTES
Patient has been compliant with compression, elevation and exercise for at least 4 weeks yet swelling persists. Hyperplasia is present with this edema.

## 2025-02-16 ENCOUNTER — PATIENT MESSAGE (OUTPATIENT)
Dept: RHEUMATOLOGY | Facility: CLINIC | Age: 25
End: 2025-02-16
Payer: MEDICAID

## 2025-02-17 ENCOUNTER — CLINICAL SUPPORT (OUTPATIENT)
Dept: REHABILITATION | Facility: HOSPITAL | Age: 25
End: 2025-02-17
Payer: MEDICAID

## 2025-02-17 DIAGNOSIS — I89.0 LYMPHEDEMA OF BOTH LOWER EXTREMITIES: Primary | ICD-10-CM

## 2025-02-17 PROCEDURE — 97530 THERAPEUTIC ACTIVITIES: CPT

## 2025-02-17 NOTE — PROGRESS NOTES
[facility]  Occupational Therapy Treatment Note  Lymphedema     Date: 1/28/2025  Name: Tawnya Sanz  Clinic Number: [id number]     Therapy Diagnosis:        Encounter Diagnosis   Name Primary?    Lymphedema of both lower extremities Yes      Physician: Bentley Pittman MD*     Physician Orders: Eval and Treat  Medical Diagnosis: I89.0 (ICD-10-CM) - Lymphedema of both lower extremities   Evaluation Date: 1/8/2025  Insurance Authorization Period Expiration: 4/1/2025  Plan of Care Certification Period: 4/1/2025  Visit # / Visits authorized: 7 / 8  FOTO: see media, 2 / 3     Precautions:  Standard     Time In: 3:30  Time Out: 4:30  Total Billable Time: 60 minutes     SUBJECTIVE      Pt reports: Toes that were not wrapped had a burning sensation therefore she had to remove gauze/padding. It felt like skin irritation as opposed to circulation issues.   Tawnya reported wearing compression outside of therapy visits: Yes   She was compliant with home exercise program given last session.   Response to previous treatment: no notable change  Functional change: none     Pain: did not quantify         OBJECTIVE      Pt arrived in NAD     Compression garment status: 20-30 mm hg knee highs, toe caps  Compression Rx status: requested from MD  Pneumatic pump status: referred by MD, not yet received     LE Girth Measurements: taken in supine  LANDMARK RIGHT LE  1/8/25 LEFT LE  1/8/25 LANDMARK RIGHT LE  2/3/25 LEFT LE  2/3/25 LEFT LE  2/17/25   SBP - -  - - -   10 below SBP 51.0 cm 51.0 cm Digit 5 DIP 5.5 cm 5.5 cm 5.5 cm   20 below SBP 48.0 cm 48.0 cm Digit 4 DIP 5.5 cm 5.5 cm 5.5 cm   30 below SBP 37.0 cm 37.0 cm Digit 3 DIP 5.5 cm 5.5 cm 5.5 cm   35 below SBP 29.0 cm 29.0 cm Digit 2 DIP 6.0 cm 6.5 cm 6.0 cm   Ankle 28.0 cm 29.0 cm Digit 1 DIP 9.25 cm 9.5 cm 9.0 cm   Forefoot 26.0 cm 27.0 cm Forefoot 25.5 cm 26.5 cm 25.0 cm       Digit measured post pump treatment. No reductions in  circumferential measurements.      Treatment      Tawnya received the treatments listed below:         Therapeutic activity techniques were applied for 60 minutes, including:     SEQUENTIAL COMPRESSION PUMP: full leg sleeve applied to LLE  Airos 6 with default setting with distal pressures starting at 45mmHg entire extremity simultaneous to education.      MULTILAYERED BANDAGING:  issued supplies and bandaged LLE  with elastomull and Komprex 2 to digits, edema wear stockinette base of toes to above ankle, to leave intact 24-72 hrs as tolerated, discontinue with any problems, return rolled bandages next session. Wash and wear schedules confirmed.       Patient was educated in   Compression needs: 20-30 mm hg knee highs and Toe caps  Home management plan:  Cost/coverage of compression garments: Medicare now pays for compression. Private insurance coverage is on a case-by-case basis. In order to determine coverage an Rx with a diagnosis of lymphedema is sent to a participating DME for a benefits check.   Commitment to attendance and securing compression needs are critical to lymphedema management  Pneumatic pump benefits, set-up, use, referral process, coverage through insurance. Coverage is based on insurance plan as well as if deductible has been met. Pneumatic pump is an added tool for managing lymphedema at home and is meant to be used in addition to wearing compression garments daily but does not substitute compression garments.   Self MANUAL LYMPHATIC DRAINAGE techniques.             Patient Education and Home Exercises       Education provided:   - see above  - Progress towards goals      Written Home Exercises Provided: no        Assessment      Edema reducing as per updated measurements but still persists. Home pump referral being processed through [employer].    Pt has tried elevation, exercise, and compression for 4 weeks but hyperplasia and swelling persist. A home pneumatic compression device is  recommended for lifetime management.     Pt would continue to benefit from skilled OT.       Tawnya is progressing well towards her goals and there are no updates to goals at this time. Pt prognosis is Good.      Pt will continue to benefit from skilled outpatient occupational therapy to address the deficits listed in the problem list on initial evaluation provide pt/family education and to maximize pt's level of independence in the home and community environment.      Pt's spiritual, cultural and educational needs considered and pt agreeable to plan of care and goals.     Anticipated barriers to occupational therapy: none     Goals:  Short Term Goals: (2 weeks)  Goals: Progressing / MET   1. Patient will demonstrate 100% knowledge of lymphedema precautions and signs of infection to allow for reduced lymphedema risk, infection risk, and/or exacerbation of condition.  NOT MET   2. Patient will tolerate daily activities wearing multilayered bandaging to allow for lymphatic drainage support, skin elasticity, and reduction in shape and size of limb.  NOT MET   3. Patient and/or caregiver will order/obtain appropriate compression garments to maintain lymphatic and venous support.  NOT MET   4. Patient will show decreased total girth measurement in LLE by up to 0.5 cm to allow for lower extremity symmetry, shoe and clothing choice, and ability to apply needed compression. NOT MET      Long Term Goals: (4 weeks)  Goals: Progressing / MET   1. Patient and/or caregiver to lukas/doff compression garment independently and with daily compliance to assist in lymphedema management, skin elasticity, and tissue density NOT MET   2. Patient and/or caregiver will be independent in use of pneumatic compression pump or self manual lymphatic drainage techniques to areas within reach to enhance lymphatic drainage and skin condition.  NOT MET   3. Patient to be independent and compliant with home exercise program to allow for increased  function in affected limb. NOT MET   4. Patient will show decreased total girth measurement in LLE by up to 1.0 cm  to allow for lower extremity symmetry, shoe and clothing choice, and ability to apply needed compression daily. NOT MET            PLAN      Updates/Grading for next session: none     Latasha Marin, OT, CLWT

## 2025-02-19 ENCOUNTER — CLINICAL SUPPORT (OUTPATIENT)
Dept: REHABILITATION | Facility: HOSPITAL | Age: 25
End: 2025-02-19
Payer: MEDICAID

## 2025-02-19 DIAGNOSIS — I89.0 LYMPHEDEMA OF BOTH LOWER EXTREMITIES: Primary | ICD-10-CM

## 2025-02-19 PROCEDURE — 97530 THERAPEUTIC ACTIVITIES: CPT

## 2025-02-24 ENCOUNTER — CLINICAL SUPPORT (OUTPATIENT)
Dept: REHABILITATION | Facility: HOSPITAL | Age: 25
End: 2025-02-24
Payer: MEDICAID

## 2025-02-24 DIAGNOSIS — I89.0 LYMPHEDEMA OF BOTH LOWER EXTREMITIES: Primary | ICD-10-CM

## 2025-02-24 PROCEDURE — 97530 THERAPEUTIC ACTIVITIES: CPT

## 2025-02-25 NOTE — PROGRESS NOTES
[facility]  Occupational Therapy Treatment Note  Lymphedema     Date: 1/28/2025  Name: Tawnya Sanz  Clinic Number: [id number]     Therapy Diagnosis:        Encounter Diagnosis   Name Primary?    Lymphedema of both lower extremities Yes      Physician: Bentley Pittman MD*     Physician Orders: Eval and Treat  Medical Diagnosis: I89.0 (ICD-10-CM) - Lymphedema of both lower extremities   Evaluation Date: 1/8/2025  Insurance Authorization Period Expiration: 4/1/2025  Plan of Care Certification Period: 4/1/2025  Visit # / Visits authorized: 8 / 20  FOTO: see media, 2 / 3     Precautions:  Standard     Time In: 3:30  Time Out: 4:30  Total Billable Time: 60 minutes     SUBJECTIVE      Pt reports: no new concerns.   Tawnya reported wearing compression outside of therapy visits: Yes   She was compliant with home exercise program given last session.   Response to previous treatment: no notable change  Functional change: none     Pain: did not quantify         OBJECTIVE      Pt arrived in NAD     Compression garment status: 20-30 mm hg knee highs, toe caps  Compression Rx status: requested from MD  Pneumatic pump status: referred by MD, not yet received     LE Girth Measurements: taken in supine  LANDMARK RIGHT LE  1/8/25 LEFT LE  1/8/25 LANDMARK RIGHT LE  2/3/25 LEFT LE  2/3/25 LEFT LE  2/17/25   SBP - -  - - -   10 below SBP 51.0 cm 51.0 cm Digit 5 DIP 5.5 cm 5.5 cm 5.5 cm   20 below SBP 48.0 cm 48.0 cm Digit 4 DIP 5.5 cm 5.5 cm 5.5 cm   30 below SBP 37.0 cm 37.0 cm Digit 3 DIP 5.5 cm 5.5 cm 5.5 cm   35 below SBP 29.0 cm 29.0 cm Digit 2 DIP 6.0 cm 6.5 cm 6.0 cm   Ankle 28.0 cm 29.0 cm Digit 1 DIP 9.25 cm 9.5 cm 9.0 cm   Forefoot 26.0 cm 27.0 cm Forefoot 25.5 cm 26.5 cm 25.0 cm       Digit measured post pump treatment. No reductions in circumferential measurements.      Treatment      Tawnya received the treatments listed below:         Therapeutic activity techniques were  applied for 60 minutes, including:    MANUAL LYMPHATIC DRAINAGE (MLD):    While supine, stimulation at terminus, inguinal and popliteal lymph nodes, drainage of entire LLE targeting digits and dorsum of foot with return proximally.       SEQUENTIAL COMPRESSION PUMP: full leg sleeve applied to LLE  Airos 6 with default setting with distal pressures starting at 45mmHg entire extremity simultaneous to education.      MULTILAYERED BANDAGING:  issued supplies and bandaged LLE  with elastomull and Komprex 2 to digits, edema wear stockinette base of toes to above ankle, to leave intact 24-72 hrs as tolerated, discontinue with any problems, return rolled bandages next session. Wash and wear schedules confirmed.       Patient was educated in   Compression needs: 20-30 mm hg knee highs and Toe caps  Home management plan:  Cost/coverage of compression garments: Medicare now pays for compression. Private insurance coverage is on a case-by-case basis. In order to determine coverage an Rx with a diagnosis of lymphedema is sent to a participating DME for a benefits check.   Commitment to attendance and securing compression needs are critical to lymphedema management  Pneumatic pump benefits, set-up, use, referral process, coverage through insurance. Coverage is based on insurance plan as well as if deductible has been met. Pneumatic pump is an added tool for managing lymphedema at home and is meant to be used in addition to wearing compression garments daily but does not substitute compression garments.     Practiced donning/doffing inelastic toe cap garment        Patient Education and Home Exercises       Education provided:   - see above  - Progress towards goals      Written Home Exercises Provided: no        Assessment      Swelling persists. Inelatic velcro toe garment ddi not appear to apply strong enough compression. 20-30 mm hg toe caps should arrive next week.    Pt has tried elevation, exercise, and compression for 4  weeks but hyperplasia and swelling persist. A home pneumatic compression device is recommended for lifetime management.     Pt would continue to benefit from skilled OT.       Tawnya is progressing well towards her goals and there are no updates to goals at this time. Pt prognosis is Good.      Pt will continue to benefit from skilled outpatient occupational therapy to address the deficits listed in the problem list on initial evaluation provide pt/family education and to maximize pt's level of independence in the home and community environment.      Pt's spiritual, cultural and educational needs considered and pt agreeable to plan of care and goals.     Anticipated barriers to occupational therapy: none     Goals:  Short Term Goals: (2 weeks)  Goals: Progressing / MET   1. Patient will demonstrate 100% knowledge of lymphedema precautions and signs of infection to allow for reduced lymphedema risk, infection risk, and/or exacerbation of condition.  NOT MET   2. Patient will tolerate daily activities wearing multilayered bandaging to allow for lymphatic drainage support, skin elasticity, and reduction in shape and size of limb.  NOT MET   3. Patient and/or caregiver will order/obtain appropriate compression garments to maintain lymphatic and venous support.  NOT MET   4. Patient will show decreased total girth measurement in LLE by up to 0.5 cm to allow for lower extremity symmetry, shoe and clothing choice, and ability to apply needed compression. NOT MET      Long Term Goals: (4 weeks)  Goals: Progressing / MET   1. Patient and/or caregiver to lukas/doff compression garment independently and with daily compliance to assist in lymphedema management, skin elasticity, and tissue density NOT MET   2. Patient and/or caregiver will be independent in use of pneumatic compression pump or self manual lymphatic drainage techniques to areas within reach to enhance lymphatic drainage and skin condition.  NOT MET   3.  Patient to be independent and compliant with home exercise program to allow for increased function in affected limb. NOT MET   4. Patient will show decreased total girth measurement in LLE by up to 1.0 cm  to allow for lower extremity symmetry, shoe and clothing choice, and ability to apply needed compression daily. NOT MET            PLAN      Updates/Grading for next session: none     Latasha Marin, OT, CLWT

## 2025-02-28 ENCOUNTER — OFFICE VISIT (OUTPATIENT)
Dept: OBSTETRICS AND GYNECOLOGY | Facility: CLINIC | Age: 25
End: 2025-02-28
Payer: MEDICAID

## 2025-02-28 VITALS
WEIGHT: 293 LBS | DIASTOLIC BLOOD PRESSURE: 74 MMHG | HEIGHT: 66 IN | BODY MASS INDEX: 47.09 KG/M2 | SYSTOLIC BLOOD PRESSURE: 120 MMHG

## 2025-02-28 DIAGNOSIS — B37.2 CANDIDAL SKIN INFECTION: ICD-10-CM

## 2025-02-28 DIAGNOSIS — N90.89 VULVAR LESION: Primary | ICD-10-CM

## 2025-02-28 PROCEDURE — 99213 OFFICE O/P EST LOW 20 MIN: CPT | Mod: PBBFAC | Performed by: OBSTETRICS & GYNECOLOGY

## 2025-02-28 PROCEDURE — 99999 PR PBB SHADOW E&M-EST. PATIENT-LVL III: CPT | Mod: PBBFAC,,, | Performed by: OBSTETRICS & GYNECOLOGY

## 2025-02-28 RX ORDER — CLOTRIMAZOLE 1 %
CREAM (GRAM) TOPICAL 2 TIMES DAILY
Qty: 45 G | Refills: 0 | Status: SHIPPED | OUTPATIENT
Start: 2025-02-28

## 2025-02-28 RX ORDER — NYSTATIN 100000 [USP'U]/G
POWDER TOPICAL
Qty: 60 G | Refills: 0 | Status: SHIPPED | OUTPATIENT
Start: 2025-02-28 | End: 2026-02-28

## 2025-02-28 NOTE — PROGRESS NOTES
"Past medical, surgical, social, family, and obstetric histories; medications; prior records and results; and available outside records were reviewed and updated in the EMR.  Pertinent findings were noted below.    Reason for Visit   vaginal skin tag     HPI   24 y.o. female , 4th year at     Patient's last menstrual period was 02/10/2025 (approximate).    C/o skin lesion on vulva. Felt something while in the shower. Had pain bc pulled on it. No longer feeling pain.  Has her menstrual cycle at least every 3 months  Concerned about a fungal rash near lower abdomen    Contraception: None - not SA  Pap:  No recent documented pap - has not initiated intercourse yet  Mammogram:  N/A    Exam   /74   Ht 5' 6" (1.676 m)   Wt (!) 145 kg (319 lb 10.7 oz)   LMP 02/10/2025 (Approximate)   BMI 51.60 kg/m²     Physical Exam  Constitutional:       General: She is not in acute distress.     Appearance: Normal appearance. She is obese.     Genitourinary:    Vulva normal.   The external female genitalia was normal.   There is lesion (small skin tag along right labia majora) on the right vulva.  HENT:      Head: Normocephalic and atraumatic.   Musculoskeletal:         General: Normal range of motion.   Neurological:      General: No focal deficit present.      Mental Status: She is alert and oriented to person, place, and time.   Skin:     Findings: Erythema and rash (lower abdominal fold, c/w candida) present.   Psychiatric:         Mood and Affect: Mood normal.         Behavior: Behavior normal.         Thought Content: Thought content normal.         Judgment: Judgment normal.       Assessment and Plan   Vulvar lesion    Candidal skin infection  -     nystatin (MYCOSTATIN) powder; Apply to affected area 3 times daily  Dispense: 60 g; Refill: 0  -     clotrimazole (LOTRIMIN) 1 % cream; Apply topically 2 (two) times daily.  Dispense: 45 g; Refill: 0      Reassured patient re: skin tag  RX sent for skin fold candidal " rash

## 2025-03-05 ENCOUNTER — CLINICAL SUPPORT (OUTPATIENT)
Dept: REHABILITATION | Facility: HOSPITAL | Age: 25
End: 2025-03-05
Payer: MEDICAID

## 2025-03-05 DIAGNOSIS — I89.0 LYMPHEDEMA OF BOTH LOWER EXTREMITIES: Primary | ICD-10-CM

## 2025-03-05 PROCEDURE — 97530 THERAPEUTIC ACTIVITIES: CPT

## 2025-03-05 NOTE — PROGRESS NOTES
[facility]  Occupational Therapy Treatment Note  Lymphedema     Date: 1/28/2025  Name: Tawnya Sanz  Clinic Number: [id number]     Therapy Diagnosis:        Encounter Diagnosis   Name Primary?    Lymphedema of both lower extremities Yes      Physician: Bentley Pittman MD*     Physician Orders: Eval and Treat  Medical Diagnosis: I89.0 (ICD-10-CM) - Lymphedema of both lower extremities   Evaluation Date: 1/8/2025  Insurance Authorization Period Expiration: 4/1/2025  Plan of Care Certification Period: 4/1/2025  Visit # / Visits authorized: 9 / 20  FOTO: see media, 2 / 3     Precautions:  Standard     Time In: 3:30  Time Out: 4:30  Total Billable Time: 60 minutes     SUBJECTIVE      Pt reports: New toe caps do not feel tight. She is going to return Velcro toe caps.   Tawnya reported wearing compression outside of therapy visits: Yes   She was compliant with home exercise program given last session.   Response to previous treatment: no notable change  Functional change: none     Pain: did not quantify         OBJECTIVE      Pt arrived in NAD     Compression garment status: 20-30 mm hg knee highs, toe caps  Compression Rx status: requested from MD  Pneumatic pump status: referred by MD, not yet received     LE Girth Measurements: taken in supine  LANDMARK RIGHT LE  1/8/25 LEFT LE  1/8/25 LANDMARK RIGHT LE  2/3/25 LEFT LE  2/3/25 LEFT LE  2/17/25   SBP - -  - - -   10 below SBP 51.0 cm 51.0 cm Digit 5 DIP 5.5 cm 5.5 cm 5.5 cm   20 below SBP 48.0 cm 48.0 cm Digit 4 DIP 5.5 cm 5.5 cm 5.5 cm   30 below SBP 37.0 cm 37.0 cm Digit 3 DIP 5.5 cm 5.5 cm 5.5 cm   35 below SBP 29.0 cm 29.0 cm Digit 2 DIP 6.0 cm 6.5 cm 6.0 cm   Ankle 28.0 cm 29.0 cm Digit 1 DIP 9.25 cm 9.5 cm 9.0 cm   Forefoot 26.0 cm 27.0 cm Forefoot 25.5 cm 26.5 cm 25.0 cm       Digit measured post pump treatment. No reductions in circumferential measurements.      Treatment      Tawnya received the treatments listed  below:         Therapeutic activity techniques were applied for 60 minutes, including:    MANUAL LYMPHATIC DRAINAGE (MLD):    While supine, stimulation at terminus, inguinal and popliteal lymph nodes, drainage of entire LLE targeting digits and dorsum of foot with return proximally.       SEQUENTIAL COMPRESSION PUMP: full leg sleeve applied to LLE  Airos 6 with default setting with distal pressures starting at 45mmHg entire extremity simultaneous to education.      MULTILAYERED BANDAGING:  issued supplies and bandaged LLE  with elastomull and Komprex 2 to digits, edema wear stockinette base of toes to above ankle, to leave intact 24-72 hrs as tolerated, discontinue with any problems, return rolled bandages next session. Wash and wear schedules confirmed.       Patient was educated in   Compression needs: 20-30 mm hg knee highs and Toe caps  Home management plan:  Cost/coverage of compression garments: Medicare now pays for compression. Private insurance coverage is on a case-by-case basis. In order to determine coverage an Rx with a diagnosis of lymphedema is sent to a participating DME for a benefits check.   Commitment to attendance and securing compression needs are critical to lymphedema management  Pneumatic pump benefits, set-up, use, referral process, coverage through insurance. Coverage is based on insurance plan as well as if deductible has been met. Pneumatic pump is an added tool for managing lymphedema at home and is meant to be used in addition to wearing compression garments daily but does not substitute compression garments.     Practiced donning/doffing inelastic toe cap garment        Patient Education and Home Exercises       Education provided:   - see above  - Progress towards goals      Written Home Exercises Provided: no        Assessment      New 20-30 mm hg toe caps do not provide strong tension unless ends are folded over. Pt to wear daily as aforementioned until next session and assess  containment.     Pt has tried elevation, exercise, and compression for 4 weeks but hyperplasia and swelling persist. A home pneumatic compression device is recommended for lifetime management.     Pt would continue to benefit from skilled OT.       Tawnya is progressing well towards her goals and there are no updates to goals at this time. Pt prognosis is Good.      Pt will continue to benefit from skilled outpatient occupational therapy to address the deficits listed in the problem list on initial evaluation provide pt/family education and to maximize pt's level of independence in the home and community environment.      Pt's spiritual, cultural and educational needs considered and pt agreeable to plan of care and goals.     Anticipated barriers to occupational therapy: none     Goals:  Short Term Goals: (2 weeks)  Goals: Progressing / MET   1. Patient will demonstrate 100% knowledge of lymphedema precautions and signs of infection to allow for reduced lymphedema risk, infection risk, and/or exacerbation of condition.  NOT MET   2. Patient will tolerate daily activities wearing multilayered bandaging to allow for lymphatic drainage support, skin elasticity, and reduction in shape and size of limb.  NOT MET   3. Patient and/or caregiver will order/obtain appropriate compression garments to maintain lymphatic and venous support.  NOT MET   4. Patient will show decreased total girth measurement in LLE by up to 0.5 cm to allow for lower extremity symmetry, shoe and clothing choice, and ability to apply needed compression. NOT MET      Long Term Goals: (4 weeks)  Goals: Progressing / MET   1. Patient and/or caregiver to lukas/doff compression garment independently and with daily compliance to assist in lymphedema management, skin elasticity, and tissue density NOT MET   2. Patient and/or caregiver will be independent in use of pneumatic compression pump or self manual lymphatic drainage techniques to areas within  reach to enhance lymphatic drainage and skin condition.  NOT MET   3. Patient to be independent and compliant with home exercise program to allow for increased function in affected limb. NOT MET   4. Patient will show decreased total girth measurement in LLE by up to 1.0 cm  to allow for lower extremity symmetry, shoe and clothing choice, and ability to apply needed compression daily. NOT MET            PLAN      Updates/Grading for next session: none     Latasha Marin, OT, CLWT

## 2025-03-05 NOTE — PROGRESS NOTES
[facility]  Occupational Therapy Treatment Note  Lymphedema     Date: 1/28/2025  Name: Tawnya Sanz  Clinic Number: [id number]     Therapy Diagnosis:        Encounter Diagnosis   Name Primary?    Lymphedema of both lower extremities Yes      Physician: Bentley Pittman MD*     Physician Orders: Eval and Treat  Medical Diagnosis: I89.0 (ICD-10-CM) - Lymphedema of both lower extremities   Evaluation Date: 1/8/2025  Insurance Authorization Period Expiration: 4/1/2025  Plan of Care Certification Period: 4/1/2025  Visit # / Visits authorized: 10 / 20  FOTO: see media, 2 / 3     Precautions:  Standard     Time In: 3:33  Time Out: 4:33  Total Billable Time: 60 minutes     SUBJECTIVE      Pt reports: Toe caps do not feel very tight despite folding over end.   Tawnya reported wearing compression outside of therapy visits: Yes   She was compliant with home exercise program given last session.   Response to previous treatment: no notable change  Functional change: none     Pain: did not quantify         OBJECTIVE      Pt arrived in NAD     Compression garment status: 20-30 mm hg knee highs, toe caps  Compression Rx status: requested from MD  Pneumatic pump status: referred by MD, not yet received     LE Girth Measurements: taken in supine  LANDMARK RIGHT LE  1/8/25 LEFT LE  1/8/25 LANDMARK RIGHT LE  2/3/25 LEFT LE  2/3/25 LEFT LE  2/17/25   SBP - -  - - -   10 below SBP 51.0 cm 51.0 cm Digit 5 DIP 5.5 cm 5.5 cm 5.5 cm   20 below SBP 48.0 cm 48.0 cm Digit 4 DIP 5.5 cm 5.5 cm 5.5 cm   30 below SBP 37.0 cm 37.0 cm Digit 3 DIP 5.5 cm 5.5 cm 5.5 cm   35 below SBP 29.0 cm 29.0 cm Digit 2 DIP 6.0 cm 6.5 cm 6.0 cm   Ankle 28.0 cm 29.0 cm Digit 1 DIP 9.25 cm 9.5 cm 9.0 cm   Forefoot 26.0 cm 27.0 cm Forefoot 25.5 cm 26.5 cm 25.0 cm       Digit measured post pump treatment. No reductions in circumferential measurements.      Treatment      Tawnya received the treatments listed below:          Therapeutic activity techniques were applied for 60 minutes, including:    MANUAL LYMPHATIC DRAINAGE (MLD):    While supine, stimulation at terminus, inguinal and popliteal lymph nodes, drainage of entire LLE targeting digits and dorsum of foot with return proximally.       SEQUENTIAL COMPRESSION PUMP: full leg sleeve applied to LLE  Airos 6 with default setting with distal pressures starting at 45mmHg entire extremity simultaneous to education.      MULTILAYERED BANDAGING:  issued supplies and bandaged LLE  with elastomull and Komprex 2 to digits and dorsum of foot, edema wear stockinette base of toes to above ankle, 6 cm rosidal K bandage donned base of toes to ankle, to leave intact 24-72 hrs as tolerated, discontinue with any problems, return rolled bandages next session. Wash and wear schedules confirmed.       Patient was educated in   Compression needs: 20-30 mm hg knee highs and Toe caps  Home management plan:  Cost/coverage of compression garments: Medicare now pays for compression. Private insurance coverage is on a case-by-case basis. In order to determine coverage an Rx with a diagnosis of lymphedema is sent to a participating DME for a benefits check.   Commitment to attendance and securing compression needs are critical to lymphedema management  Pneumatic pump benefits, set-up, use, referral process, coverage through insurance. Coverage is based on insurance plan as well as if deductible has been met. Pneumatic pump is an added tool for managing lymphedema at home and is meant to be used in addition to wearing compression garments daily but does not substitute compression garments.           Patient Education and Home Exercises       Education provided:   - see above  - Progress towards goals      Written Home Exercises Provided: no        Assessment      Komprex 2 included on dorsum of foot and digits. Pt to self bandage daily until next session to assess reductions. If a break is needed from  bandages, pt to wear toe caps. Discussed daily compression is needed even when home with legs elevated for optimal reductions.     Pt has tried elevation, exercise, and compression for 4 weeks but hyperplasia and swelling persist. A home pneumatic compression device is recommended for lifetime management.     Pt would continue to benefit from skilled OT.       Tawnya is progressing well towards her goals and there are no updates to goals at this time. Pt prognosis is Good.      Pt will continue to benefit from skilled outpatient occupational therapy to address the deficits listed in the problem list on initial evaluation provide pt/family education and to maximize pt's level of independence in the home and community environment.      Pt's spiritual, cultural and educational needs considered and pt agreeable to plan of care and goals.     Anticipated barriers to occupational therapy: none     Goals:  Short Term Goals: (2 weeks)  Goals: Progressing / MET   1. Patient will demonstrate 100% knowledge of lymphedema precautions and signs of infection to allow for reduced lymphedema risk, infection risk, and/or exacerbation of condition.  NOT MET   2. Patient will tolerate daily activities wearing multilayered bandaging to allow for lymphatic drainage support, skin elasticity, and reduction in shape and size of limb.  NOT MET   3. Patient and/or caregiver will order/obtain appropriate compression garments to maintain lymphatic and venous support.  NOT MET   4. Patient will show decreased total girth measurement in LLE by up to 0.5 cm to allow for lower extremity symmetry, shoe and clothing choice, and ability to apply needed compression. NOT MET      Long Term Goals: (4 weeks)  Goals: Progressing / MET   1. Patient and/or caregiver to lukas/doff compression garment independently and with daily compliance to assist in lymphedema management, skin elasticity, and tissue density NOT MET   2. Patient and/or caregiver will be  independent in use of pneumatic compression pump or self manual lymphatic drainage techniques to areas within reach to enhance lymphatic drainage and skin condition.  NOT MET   3. Patient to be independent and compliant with home exercise program to allow for increased function in affected limb. NOT MET   4. Patient will show decreased total girth measurement in LLE by up to 1.0 cm  to allow for lower extremity symmetry, shoe and clothing choice, and ability to apply needed compression daily. NOT MET            PLAN      Updates/Grading for next session: none     Latasha Marin, OT, CLWT

## 2025-03-11 ENCOUNTER — CLINICAL SUPPORT (OUTPATIENT)
Dept: REHABILITATION | Facility: HOSPITAL | Age: 25
End: 2025-03-11
Payer: MEDICAID

## 2025-03-11 DIAGNOSIS — I89.0 LYMPHEDEMA OF BOTH LOWER EXTREMITIES: Primary | ICD-10-CM

## 2025-03-11 PROCEDURE — 97530 THERAPEUTIC ACTIVITIES: CPT

## 2025-03-11 NOTE — PROGRESS NOTES
[facility]  Occupational Therapy Treatment Note  Lymphedema     Date: 1/28/2025  Name: Tawnya Sanz  Clinic Number: [id number]     Therapy Diagnosis:        Encounter Diagnosis   Name Primary?    Lymphedema of both lower extremities Yes      Physician: Bentley Pittman MD*     Physician Orders: Eval and Treat  Medical Diagnosis: I89.0 (ICD-10-CM) - Lymphedema of both lower extremities   Evaluation Date: 1/8/2025  Insurance Authorization Period Expiration: 4/1/2025  Plan of Care Certification Period: 4/1/2025  Visit # / Visits authorized: 11 / 20  FOTO: see media, 2 / 3     Precautions:  Standard     Time In: 9:05  Time Out: 10:00  Total Billable Time: 55 minutes     SUBJECTIVE      Pt reports: She self bandaged daily since last session and has not noticed reductions. Pump arrived late last night.   Tawnya reported wearing compression outside of therapy visits: Yes   She was compliant with home exercise program given last session.   Response to previous treatment: no notable change  Functional change: none     Pain: did not quantify         OBJECTIVE      Pt arrived in NAD     Compression garment status: 20-30 mm hg knee highs, toe caps  Compression Rx status: requested from MD  Pneumatic pump status: referred by MD, not yet received     LE Girth Measurements: taken in supine  LANDMARK RIGHT LE  1/8/25 LEFT LE  1/8/25 LANDMARK RIGHT LE  2/3/25 LEFT LE  2/3/25 LEFT LE  2/17/25   SBP - -  - - -   10 below SBP 51.0 cm 51.0 cm Digit 5 DIP 5.5 cm 5.5 cm 5.5 cm   20 below SBP 48.0 cm 48.0 cm Digit 4 DIP 5.5 cm 5.5 cm 5.5 cm   30 below SBP 37.0 cm 37.0 cm Digit 3 DIP 5.5 cm 5.5 cm 5.5 cm   35 below SBP 29.0 cm 29.0 cm Digit 2 DIP 6.0 cm 6.5 cm 6.0 cm   Ankle 28.0 cm 29.0 cm Digit 1 DIP 9.25 cm 9.5 cm 9.0 cm   Forefoot 26.0 cm 27.0 cm Forefoot 25.5 cm 26.5 cm 25.0 cm     LANDMARK LLE   3/11/25   Digit 5 DIP -   Digit 4 DIP -   Digit 3 DIP -   Digit 2 DIP 6.5 cm   Digit 1 DIP 9.5  cm   Forefoot 26.0 cm        Treatment      Tawnya received the treatments listed below:         Therapeutic activity techniques were applied for 55 minutes, including:    MANUAL LYMPHATIC DRAINAGE (MLD):    While supine, stimulation at terminus, inguinal and popliteal lymph nodes, drainage of entire LLE targeting digits and dorsum of foot with return proximally.          MULTILAYERED BANDAGING:  issued supplies and bandaged LLE  with elastomull and Komprex 2 to digits and dorsum of foot, edema wear stockinette base of toes to above ankle, 6 cm rosidal K bandage donned base of toes to ankle, to leave intact 24-72 hrs as tolerated, discontinue with any problems, return rolled bandages next session. Wash and wear schedules confirmed.       Patient was educated in   Compression needs: 20-30 mm hg knee highs and Toe caps  Home management plan:  Cost/coverage of compression garments: Medicare now pays for compression. Private insurance coverage is on a case-by-case basis. In order to determine coverage an Rx with a diagnosis of lymphedema is sent to a participating DME for a benefits check.   Commitment to attendance and securing compression needs are critical to lymphedema management  Pneumatic pump benefits, set-up, use, referral process, coverage through insurance. Coverage is based on insurance plan as well as if deductible has been met. Pneumatic pump is an added tool for managing lymphedema at home and is meant to be used in addition to wearing compression garments daily but does not substitute compression garments.           Patient Education and Home Exercises       Education provided:   - see above  - Progress towards goals      Written Home Exercises Provided: no        Assessment      Dorsum of foot appeared less swollen after including Komprex 2 insert though girth reduction insignificant. Pt to speak with Dr. Pittman this week regarding options given limited results/reductions in therapy.     Pt has  tried elevation, exercise, and compression for 4 weeks but hyperplasia and swelling persist. A home pneumatic compression device is recommended for lifetime management.     Pt would continue to benefit from skilled OT.       Tawnya is progressing well towards her goals and there are no updates to goals at this time. Pt prognosis is Good.      Pt will continue to benefit from skilled outpatient occupational therapy to address the deficits listed in the problem list on initial evaluation provide pt/family education and to maximize pt's level of independence in the home and community environment.      Pt's spiritual, cultural and educational needs considered and pt agreeable to plan of care and goals.     Anticipated barriers to occupational therapy: none     Goals:  Short Term Goals: (2 weeks)  Goals: Progressing / MET   1. Patient will demonstrate 100% knowledge of lymphedema precautions and signs of infection to allow for reduced lymphedema risk, infection risk, and/or exacerbation of condition.  NOT MET   2. Patient will tolerate daily activities wearing multilayered bandaging to allow for lymphatic drainage support, skin elasticity, and reduction in shape and size of limb.  NOT MET   3. Patient and/or caregiver will order/obtain appropriate compression garments to maintain lymphatic and venous support.  NOT MET   4. Patient will show decreased total girth measurement in LLE by up to 0.5 cm to allow for lower extremity symmetry, shoe and clothing choice, and ability to apply needed compression. NOT MET      Long Term Goals: (4 weeks)  Goals: Progressing / MET   1. Patient and/or caregiver to lukas/doff compression garment independently and with daily compliance to assist in lymphedema management, skin elasticity, and tissue density NOT MET   2. Patient and/or caregiver will be independent in use of pneumatic compression pump or self manual lymphatic drainage techniques to areas within reach to enhance lymphatic  drainage and skin condition.  NOT MET   3. Patient to be independent and compliant with home exercise program to allow for increased function in affected limb. NOT MET   4. Patient will show decreased total girth measurement in LLE by up to 1.0 cm  to allow for lower extremity symmetry, shoe and clothing choice, and ability to apply needed compression daily. NOT MET            PLAN      Updates/Grading for next session: none     Latasha Marin, OT, CLWT

## 2025-03-13 ENCOUNTER — OFFICE VISIT (OUTPATIENT)
Dept: CARDIOLOGY | Facility: CLINIC | Age: 25
End: 2025-03-13
Payer: MEDICAID

## 2025-03-13 VITALS — HEIGHT: 66 IN | WEIGHT: 293 LBS | BODY MASS INDEX: 47.09 KG/M2

## 2025-03-13 DIAGNOSIS — E66.813 CLASS 3 SEVERE OBESITY DUE TO EXCESS CALORIES WITH SERIOUS COMORBIDITY AND BODY MASS INDEX (BMI) OF 50.0 TO 59.9 IN ADULT: Primary | ICD-10-CM

## 2025-03-13 DIAGNOSIS — E66.01 CLASS 3 SEVERE OBESITY DUE TO EXCESS CALORIES WITH SERIOUS COMORBIDITY AND BODY MASS INDEX (BMI) OF 50.0 TO 59.9 IN ADULT: Primary | ICD-10-CM

## 2025-03-13 DIAGNOSIS — I89.0 LYMPHEDEMA OF BOTH LOWER EXTREMITIES: ICD-10-CM

## 2025-03-13 DIAGNOSIS — I87.2 VENOUS INSUFFICIENCY OF BOTH LOWER EXTREMITIES: ICD-10-CM

## 2025-03-13 NOTE — PROGRESS NOTES
[facility]      Chief Complaint   Patient presents with    Lymphedema of both lower extremities       Patient ID: Tawnya Sanz is a 24 y.o. female with MASLD, morbid obesity, who presents for a follow up appointment. Pertinent history/events are as follows:     -Pt kindly referred by Barb MACIAS for evaluation of localized swelling of left lower extremity .    -At our initial clinic visit on 11/7/2024, Ms. Sanz reports left leg swelling starting in 1/2022.  She has a bout of LLE cellulitis in 3/2022.  States leg swelling occurs daily.  No claudication symptoms or tissue loss. 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   Plan:  LLE Edema- Due to lymphedema and possible venous insufficiency. Check BLE venous reflux study and KASSIE study. Pt presents with findings consistent with mild lymphedema.  Refer to lymphedema clinic.  Recommend wearing graduated compression hose.  Limit sodium intake to 2000 mg daily.  Limit volume intake to 1.5 L daily.  Elevate legs when resting.  Morbid Obesity- Encourage diet, exercise, and weight loss.    HPI:  Ms. Sanz reports some improvement left leg swelling. She has started lymphedema clinic therapy. She has no claudication symptoms or tissue loss. KASSIE Study on 11/14/2024 revealed normal resting ABIs bilaterally. BLE Venous Reflux Study 11/14/2024 demonstrated hemodynamically significant BLE venous reflux and no DVT.     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    Chronic sinusitis, unspecified     Obesity, unspecified      Past Surgical History:   Procedure Laterality Date    WISDOM TOOTH EXTRACTION       Social History     Socioeconomic History    Marital status: Single   Occupational History    Occupation: medical student   Tobacco Use    Smoking status: Never    Smokeless tobacco: Never   Substance and Sexual Activity    Alcohol use: Yes     Comment: socially 1 x month    Drug  use: Never    Sexual activity: Never   Social History Narrative    Medical student at      Social Drivers of Health     Financial Resource Strain: Low Risk  (1/15/2024)    Overall Financial Resource Strain (CARDIA)     Difficulty of Paying Living Expenses: Not very hard   Food Insecurity: No Food Insecurity (1/15/2024)    Hunger Vital Sign     Worried About Running Out of Food in the Last Year: Never true     Ran Out of Food in the Last Year: Never true   Transportation Needs: No Transportation Needs (1/15/2024)    PRAPARE - Transportation     Lack of Transportation (Medical): No     Lack of Transportation (Non-Medical): No   Physical Activity: Unknown (3/13/2025)    Exercise Vital Sign     Days of Exercise per Week: 0 days   Stress: No Stress Concern Present (7/29/2024)    Received from [facility] of Occupational Health - Occupational Stress Questionnaire     Feeling of Stress : Only a little   Housing Stability: High Risk (1/15/2024)    Housing Stability Vital Sign     Unable to Pay for Housing in the Last Year: No     Number of Places Lived in the Last Year: 3     Unstable Housing in the Last Year: No     Family History   Problem Relation Name Age of Onset    Cancer Mother          thyroid    Hypertension Mother      Heart failure Mother      Diabetes Mother      Cancer Father          skin - unsure type       Review of patient's allergies indicates:  No Known Allergies    Medication List with Changes/Refills   Current Medications    CICLOPIROX (PENLAC) 8 % SOLN    Apply over nail and surrounding skin NIGHTLY.  Apply daily over previous coat. After seven (7) days, may remove with alcohol and continue cycle.    CLINDAMYCIN PHOSPHATE (CLINDAGEL) 1 % GLQD    Apply 1 Application topically daily    CLOTRIMAZOLE (LOTRIMIN) 1 % CREAM    Apply topically 2 (two) times daily.    FLUTICASONE PROPIONATE (FLONASE) 50 MCG/ACTUATION NASAL SPRAY    2 sprays by Nasal route daily    KETOCONAZOLE (NIZORAL) 2  "% CREAM    Apply twice a day for two to three weeks until scaling has resolved. After resolved, continue treating for additional week.    MEDROXYPROGESTERONE (PROVERA) 10 MG TABLET    Take 1 tablet (10 mg total) by mouth once daily.    NYSTATIN (MYCOSTATIN) POWDER    Apply to affected area 3 times daily    PROPYLENE GLYCOL 0.6 % DROP    Apply to eye as needed.    TERBINAFINE HCL (LAMISIL) 1 % CREAM    Apply topically 2 (two) times daily.    TRIAMCINOLONE ACETONIDE 0.1% (KENALOG) 0.1 % CREAM    Apply twice a day as needed for itchy raised skin.  Don't use on face, armpits, or groin.       Review of Systems  Constitution: Denies chills, fever, and sweats.  HENT: Denies headaches or blurry vision.  Cardiovascular: Denies chest pain or irregular heart beat.  Respiratory: Denies cough or shortness of breath.  Gastrointestinal: Denies abdominal pain, nausea, or vomiting.  Musculoskeletal: Denies muscle cramps.  Neurological: Denies dizziness or focal weakness.  Psychiatric/Behavioral: Normal mental status.  Hematologic/Lymphatic: Denies bleeding problem or easy bruising/bleeding.  Skin: Denies rash or suspicious lesions    Physical Examination  Ht 5' 6" (1.676 m)   Wt (!) 149.7 kg (330 lb)   LMP 02/10/2025 (Approximate)   BMI 53.26 kg/m²     Constitutional: No acute distress, conversant  HEENT: Sclera anicteric, Pupils equal, round and reactive to light, extraocular motions intact, Oropharynx clear  Neck: No JVD, no carotid bruits  Cardiovascular: regular rate and rhythm, no murmur, rubs or gallops, normal S1/S2  Pulmonary: Clear to auscultation bilaterally  Abdominal: Abdomen soft, nontender, nondistended, positive bowel sounds  Extremities: No lower extremity edema,   Pulses:  Carotid pulses are 2+ on the right side, and 2+ on the left side.  Radial pulses are 2+ on the right side, and 2+ on the left side.   Femoral pulses are 2+ on the right side, and 2+ on the left side.  Popliteal pulses are 2+ on the right side, " "and 2+ on the left side.   Dorsalis pedis pulses are 2+ on the right side, and 2+ on the left side.   Posterior tibial pulses are 2+ on the right side, and 2+ on the left side.    Skin: No ecchymosis, erythema, or ulcers  Psych: Alert and oriented x 3, appropriate affect  Neuro: CNII-XII intact, no focal deficits    Labs:  Most Recent Data  CBC:   Lab Results   Component Value Date    WBC 10.14 09/25/2024    HGB 12.7 09/25/2024    HCT 39.2 09/25/2024     09/25/2024    MCV 87 09/25/2024    RDW 12.7 09/25/2024     BMP:   Lab Results   Component Value Date     09/25/2024    K 3.7 09/25/2024     09/25/2024    CO2 24 09/25/2024    BUN 12 09/25/2024    CREATININE 0.7 09/25/2024    GLU 95 09/25/2024    CALCIUM 10.1 09/25/2024     LFTS;   Lab Results   Component Value Date    PROT 6.9 10/22/2024    ALBUMIN 3.7 10/22/2024    BILITOT 0.5 10/22/2024    AST 14 10/22/2024    ALKPHOS 58 10/22/2024    ALT 22 10/22/2024     COAGS: No results found for: "INR", "PROTIME", "PTT"  FLP:   Lab Results   Component Value Date    CHOL 155 02/23/2024    HDL 53 02/23/2024    LDLCALC 91.6 02/23/2024    TRIG 52 02/23/2024    CHOLHDL 34.2 02/23/2024     CARDIAC: No results found for: "TROPONINI", "CKTOTAL", "CKMB", "BNP"    Imaging:    KASSIE Study 11/14/2024:    Normal resting ABIs bilaterally.    PVR waveforms are mildly dampened at the low thigh level bilaterally, and ankle level bilaterally.    BLE Venous Reflux Study 11/14/2024:    There is no evidence of a right lower extremity DVT.    The right common femoral vein has reflux.    The right greater saphenous vein has reflux.    There is no evidence of a left lower extremity DVT.    The left common femoral vein has reflux.    The left greater saphenous vein has reflux.     Assessment/Plan:  Tawnya Sanz is a 24 y.o. female with MASLD, morbid obesity, who presents for a follow up appointment.     LLE Edema- Due to lymphedema and venous insufficiency. Improving. Continue " lymphedema clinic therapy. KASSIE Study on 11/14/2024 revealed normal resting ABIs bilaterally. BLE Venous Reflux Study 11/14/2024 demonstrated hemodynamically significant BLE venous reflux and no DVT.  Recommend wearing graduated compression hose.  Limit sodium intake to 2000 mg daily.  Limit volume intake to 1.5 L daily.  Elevate legs when resting.    2. Morbid Obesity- Encourage diet, exercise, and weight loss.    Follow-up on May 6, 2025 as previously scheduled    Total duration of face to face visit time 15 minutes.  Total time spent counseling greater than fifty percent of total visit time.  Counseling included discussion regarding imaging findings, diagnosis, possibilities, treatment options, risks and benefits.  The patient had many questions regarding the options and long-term effects.    Bentley Pittman MD, PhD  Interventional Cardiology

## 2025-03-18 ENCOUNTER — E-CONSULT (OUTPATIENT)
Dept: FAMILY MEDICINE | Facility: CLINIC | Age: 25
End: 2025-03-18
Payer: MEDICAID

## 2025-03-18 ENCOUNTER — CLINICAL SUPPORT (OUTPATIENT)
Dept: REHABILITATION | Facility: HOSPITAL | Age: 25
End: 2025-03-18
Payer: MEDICAID

## 2025-03-18 ENCOUNTER — LAB VISIT (OUTPATIENT)
Dept: LAB | Facility: HOSPITAL | Age: 25
End: 2025-03-18
Attending: INTERNAL MEDICINE
Payer: MEDICAID

## 2025-03-18 ENCOUNTER — OFFICE VISIT (OUTPATIENT)
Dept: INTERNAL MEDICINE | Facility: CLINIC | Age: 25
End: 2025-03-18
Payer: MEDICAID

## 2025-03-18 VITALS
DIASTOLIC BLOOD PRESSURE: 64 MMHG | BODY MASS INDEX: 47.09 KG/M2 | HEART RATE: 86 BPM | SYSTOLIC BLOOD PRESSURE: 122 MMHG | WEIGHT: 293 LBS | HEIGHT: 66 IN | OXYGEN SATURATION: 99 %

## 2025-03-18 DIAGNOSIS — I89.0 LYMPHEDEMA OF BOTH LOWER EXTREMITIES: Primary | ICD-10-CM

## 2025-03-18 DIAGNOSIS — Z11.4 ENCOUNTER FOR SCREENING FOR HIV: ICD-10-CM

## 2025-03-18 DIAGNOSIS — L02.419 ABSCESS, AXILLA: ICD-10-CM

## 2025-03-18 DIAGNOSIS — K76.0 FATTY LIVER: ICD-10-CM

## 2025-03-18 DIAGNOSIS — E66.813 CLASS 3 SEVERE OBESITY DUE TO EXCESS CALORIES WITH SERIOUS COMORBIDITY AND BODY MASS INDEX (BMI) OF 50.0 TO 59.9 IN ADULT: Primary | ICD-10-CM

## 2025-03-18 DIAGNOSIS — E66.01 CLASS 3 SEVERE OBESITY DUE TO EXCESS CALORIES WITH SERIOUS COMORBIDITY AND BODY MASS INDEX (BMI) OF 50.0 TO 59.9 IN ADULT: Primary | ICD-10-CM

## 2025-03-18 DIAGNOSIS — I89.0 LYMPHEDEMA OF BOTH LOWER EXTREMITIES: ICD-10-CM

## 2025-03-18 LAB
ESTIMATED AVG GLUCOSE: 103 MG/DL (ref 68–131)
HBA1C MFR BLD: 5.2 % (ref 4–5.6)
HIV 1+2 AB+HIV1 P24 AG SERPL QL IA: NORMAL

## 2025-03-18 PROCEDURE — 87389 HIV-1 AG W/HIV-1&-2 AB AG IA: CPT | Performed by: INTERNAL MEDICINE

## 2025-03-18 PROCEDURE — 83036 HEMOGLOBIN GLYCOSYLATED A1C: CPT | Performed by: INTERNAL MEDICINE

## 2025-03-18 PROCEDURE — 99499 UNLISTED E&M SERVICE: CPT | Mod: S$PBB,,, | Performed by: INTERNAL MEDICINE

## 2025-03-18 PROCEDURE — 36415 COLL VENOUS BLD VENIPUNCTURE: CPT | Performed by: INTERNAL MEDICINE

## 2025-03-18 PROCEDURE — 99999 PR PBB SHADOW E&M-EST. PATIENT-LVL III: CPT | Mod: PBBFAC,,, | Performed by: INTERNAL MEDICINE

## 2025-03-18 PROCEDURE — 97530 THERAPEUTIC ACTIVITIES: CPT

## 2025-03-18 PROCEDURE — 99213 OFFICE O/P EST LOW 20 MIN: CPT | Mod: PBBFAC | Performed by: INTERNAL MEDICINE

## 2025-03-18 RX ORDER — DOXYCYCLINE HYCLATE 100 MG
100 TABLET ORAL EVERY 12 HOURS
Qty: 20 TABLET | Refills: 0 | Status: SHIPPED | OUTPATIENT
Start: 2025-03-18

## 2025-03-18 NOTE — PROGRESS NOTES
Subjective     Patient ID: Tawnya Sanz is a 24 y.o. female.    Chief Complaint: Weight Loss    HPI  Comes for help in weight loss.  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    C/o  many obesity related illnesses including hip and foot pain, gastric reflux and maybe biliary colic(breast pain after eating), fatty liver and lymphedema.     Wt now 333.7, up from 316 1 year ago.  She was 240 in 2021.  BMI 53.    She is ready to try a GLP-1 if covered by insurance..      We discussed compounded compunded  tirzepatide and direct to consumer mounjaro as other options, as insurance unlikely to cover.       Surgical referral for gastric sleeve also offered, but she is apprehensive..        A1c 5.1 a year ago.    C/o frequent L ear pain.      TMJ dx years ago.      Aware of clinching jaw.      Not wearing a mouth guard.       L ear peels frequently.  She picks, which she thinks causes pain down L neck.    C/o tender nodule in L axilla.  She has had abscesses in various areas previously..    Review of Systems   Constitutional:  Negative for fever and unexpected weight change.   HENT:  Negative for nasal congestion and postnasal drip.    Eyes:  Negative for pain, discharge and visual disturbance.   Respiratory:  Negative for cough, chest tightness, shortness of breath and wheezing.    Cardiovascular:  Negative for chest pain and leg swelling.   Gastrointestinal:  Negative for abdominal pain, constipation, diarrhea and nausea.   Genitourinary:  Negative for difficulty urinating, dysuria and hematuria.   Integumentary:  Negative for rash.   Neurological:  Negative for headaches.   Psychiatric/Behavioral:  Negative for dysphoric mood and sleep disturbance. The patient is not nervous/anxious.           Objective     Physical Exam  Constitutional:       General: She is not in acute distress.     Appearance: She is obese. She is not ill-appearing or diaphoretic.   Musculoskeletal:      Right lower leg: No edema.      Left lower  leg: No edema.   Skin:     Comments: Mild peeling skin L ext ear.    Tender small sq nodule, size of an almond, L chest wall, near axilla.   Neurological:      General: No focal deficit present.      Mental Status: She is alert and oriented to person, place, and time.   Psychiatric:         Mood and Affect: Mood normal.         Behavior: Behavior normal.         Thought Content: Thought content normal.            Assessment and Plan     1. BMI 50.0-59.9, adult  -     E-Consult to Pharmacy  -     Ambulatory referral/consult to Bariatric/Obesity Medicine; Future; Expected date: 03/25/2025  -     Hemoglobin A1C; Future; Expected date: 06/16/2025    2. Fatty liver    3. Lymphedema of both lower extremities    4. Abscess, axilla    5. Encounter for screening for HIV  -     HIV 1/2 Ag/Ab (4th Gen); Future; Expected date: 03/18/2025    Other orders  -     doxycycline (VIBRA-TABS) 100 MG tablet; Take 1 tablet (100 mg total) by mouth every 12 (twelve) hours.  Dispense: 20 tablet; Refill: 0           She will consider compounded tirzepatide.      She c/o fatigue, so would qualify.    E consul to pharmacy to check on insurance coverage of GLP-1  No follow-ups on file.

## 2025-03-18 NOTE — PROGRESS NOTES
[facility]  Occupational Therapy Treatment Note  Lymphedema     Date: 1/28/2025  Name: Tawnya Sanz  Clinic Number: [id number]     Therapy Diagnosis:        Encounter Diagnosis   Name Primary?    Lymphedema of both lower extremities Yes      Physician: Bentley Pittman MD*     Physician Orders: Eval and Treat  Medical Diagnosis: I89.0 (ICD-10-CM) - Lymphedema of both lower extremities   Evaluation Date: 1/8/2025  Insurance Authorization Period Expiration: 4/1/2025  Plan of Care Certification Period: 4/1/2025  Visit # / Visits authorized: 12 / 20  FOTO: see media, 2 / 3     Precautions:  Standard     Time In: 11:05  Time Out: 12:00  Total Billable Time: 55 minutes     SUBJECTIVE      Pt reports: She thinks the pump sleeves are too small. She had a virtual visit with Dr. Pittman who recommended Bumex and weight loss for ongoing swelling issues. The Komprex padding is starting to irritate her skin but she thinks she had an eczema flare in that area before starting Komprex.   Tawnya reported wearing compression outside of therapy visits: Yes   She was compliant with home exercise program given last session.   Response to previous treatment: no notable change  Functional change: none     Pain: did not quantify         OBJECTIVE      Pt arrived in NAD     Compression garment status: 20-30 mm hg knee highs, toe caps  Compression Rx status: requested from MD  Pneumatic pump status: referred by MD, not yet received     LE Girth Measurements: taken in supine  LANDMARK RIGHT LE  1/8/25 LEFT LE  1/8/25 LANDMARK RIGHT LE  2/3/25 LEFT LE  2/3/25 LEFT LE  2/17/25   SBP - -  - - -   10 below SBP 51.0 cm 51.0 cm Digit 5 DIP 5.5 cm 5.5 cm 5.5 cm   20 below SBP 48.0 cm 48.0 cm Digit 4 DIP 5.5 cm 5.5 cm 5.5 cm   30 below SBP 37.0 cm 37.0 cm Digit 3 DIP 5.5 cm 5.5 cm 5.5 cm   35 below SBP 29.0 cm 29.0 cm Digit 2 DIP 6.0 cm 6.5 cm 6.0 cm   Ankle 28.0 cm 29.0 cm Digit 1 DIP 9.25 cm 9.5 cm 9.0  cm   Forefoot 26.0 cm 27.0 cm Forefoot 25.5 cm 26.5 cm 25.0 cm     LANDMARK LLE   3/11/25   Digit 5 DIP -   Digit 4 DIP -   Digit 3 DIP -   Digit 2 DIP 6.5 cm   Digit 1 DIP 9.5 cm   Forefoot 26.0 cm        Treatment      Tawnya received the treatments listed below:         Therapeutic activity techniques were applied for 55 minutes, including:    SEQUENTIAL COMPRESSION PUMP: full leg sleeve applied to LLE  Airos 6 with default setting with distal pressures starting at 45mmHg entire extremity simultaneous to education.         MULTILAYERED BANDAGING:  issued supplies and bandaged LLE  with elastomull and Komprex 2 to digits and dorsum of foot, cotton stockinette base of toes to above ankle, 6 cm rosidal K bandage donned base of toes to ankle, to leave intact 24-72 hrs as tolerated, discontinue with any problems, return rolled bandages next session. Wash and wear schedules confirmed.       Patient was educated in   Compression needs: 20-30 mm hg knee highs and Toe caps  Home management plan:  Cost/coverage of compression garments: Medicare now pays for compression. Private insurance coverage is on a case-by-case basis. In order to determine coverage an Rx with a diagnosis of lymphedema is sent to a participating DME for a benefits check.   Commitment to attendance and securing compression needs are critical to lymphedema management  Pneumatic pump benefits, set-up, use, referral process, coverage through insurance. Coverage is based on insurance plan as well as if deductible has been met. Pneumatic pump is an added tool for managing lymphedema at home and is meant to be used in addition to wearing compression garments daily but does not substitute compression garments.           Patient Education and Home Exercises       Education provided:   - see above  - Progress towards goals      Written Home Exercises Provided: no        Assessment      Dorsum of foot continues to appear less swollen since including Komprex  2 foam. Discontinued edema wear to help give skin a break as it is visibly irritated.     Pt has tried elevation, exercise, and compression for 4 weeks but hyperplasia and swelling persist. A home pneumatic compression device is recommended for lifetime management.     Pt would continue to benefit from skilled OT.       Tawnya is progressing well towards her goals and there are no updates to goals at this time. Pt prognosis is Good.      Pt will continue to benefit from skilled outpatient occupational therapy to address the deficits listed in the problem list on initial evaluation provide pt/family education and to maximize pt's level of independence in the home and community environment.      Pt's spiritual, cultural and educational needs considered and pt agreeable to plan of care and goals.     Anticipated barriers to occupational therapy: none     Goals:  Short Term Goals: (2 weeks)  Goals: Progressing / MET   1. Patient will demonstrate 100% knowledge of lymphedema precautions and signs of infection to allow for reduced lymphedema risk, infection risk, and/or exacerbation of condition.  NOT MET   2. Patient will tolerate daily activities wearing multilayered bandaging to allow for lymphatic drainage support, skin elasticity, and reduction in shape and size of limb.  NOT MET   3. Patient and/or caregiver will order/obtain appropriate compression garments to maintain lymphatic and venous support.  NOT MET   4. Patient will show decreased total girth measurement in LLE by up to 0.5 cm to allow for lower extremity symmetry, shoe and clothing choice, and ability to apply needed compression. NOT MET      Long Term Goals: (4 weeks)  Goals: Progressing / MET   1. Patient and/or caregiver to lukas/doff compression garment independently and with daily compliance to assist in lymphedema management, skin elasticity, and tissue density NOT MET   2. Patient and/or caregiver will be independent in use of pneumatic  compression pump or self manual lymphatic drainage techniques to areas within reach to enhance lymphatic drainage and skin condition.  NOT MET   3. Patient to be independent and compliant with home exercise program to allow for increased function in affected limb. NOT MET   4. Patient will show decreased total girth measurement in LLE by up to 1.0 cm  to allow for lower extremity symmetry, shoe and clothing choice, and ability to apply needed compression daily. NOT MET            PLAN      Updates/Grading for next session: none     Latasha Marin, OT, CLWT

## 2025-03-18 NOTE — CONSULTS
[facility]  Response for E-Consult     Patient Name: Tawnya Sanz  MRN: [id number]  Primary Care Provider: Jennifer Rao MD   Requesting Provider: Jelly Sharma MD  E-Consult to Pharmacy  Consult performed by: Chari Vasquez PharmD  Consult ordered by: Jelly Sharma MD  Reason for consult: Medication counseling          Recommendation: Zepbound is approved for obese (BMI 30 kg/m2 or greater) or overweight (27 kg/m2 ) patients. Overweight patients must also have at least of the following comorbidities: HTN, dyslipidemia, T2DM, RYDER, or cardiovascular disease.     Patient's insurance will require a prior authorization but an estimated price will not be provided until PA is approved.    Additional future steps to consider: n/a    Total time of Consultation: 5 minute    I did not speak to the requesting provider verbally about this.     *This eConsult is based on the clinical data available to me and is furnished without benefit of a physical examination. The eConsult will need to be interpreted in light of any clinical issues or changes in patient status not available to me at the time of filing this eConsults. Significant changes in patient condition or level of acuity should result in immediate formal consultation and reevaluation. Please alert me if you have further questions.    Thank you for this eConsult referral.     Chari Vasquez, Ariel  [facility]

## 2025-03-19 ENCOUNTER — TELEPHONE (OUTPATIENT)
Dept: BARIATRICS | Facility: CLINIC | Age: 25
End: 2025-03-19
Payer: MEDICAID

## 2025-03-21 ENCOUNTER — RESULTS FOLLOW-UP (OUTPATIENT)
Dept: INTERNAL MEDICINE | Facility: CLINIC | Age: 25
End: 2025-03-21

## 2025-03-24 ENCOUNTER — CLINICAL SUPPORT (OUTPATIENT)
Dept: REHABILITATION | Facility: HOSPITAL | Age: 25
End: 2025-03-24
Payer: MEDICAID

## 2025-03-24 DIAGNOSIS — I89.0 LYMPHEDEMA OF BOTH LOWER EXTREMITIES: Primary | ICD-10-CM

## 2025-03-24 PROCEDURE — 97530 THERAPEUTIC ACTIVITIES: CPT

## 2025-03-24 NOTE — PROGRESS NOTES
[facility]  Occupational Therapy Treatment Note  Lymphedema     Date: 1/28/2025  Name: Tawnya Sanz  Clinic Number: [id number]     Therapy Diagnosis:        Encounter Diagnosis   Name Primary?    Lymphedema of both lower extremities Yes      Physician: Bentley Pittman MD*     Physician Orders: Eval and Treat  Medical Diagnosis: I89.0 (ICD-10-CM) - Lymphedema of both lower extremities   Evaluation Date: 1/8/2025  Insurance Authorization Period Expiration: 4/1/2025  Plan of Care Certification Period: 4/1/2025  Visit # / Visits authorized: 13 / 20  FOTO: see media, 2 / 3     Precautions:  Standard     Time In: 8:05  Time Out: 9:00  Total Billable Time: 55 minutes     SUBJECTIVE      Pt reports: Increased swelling in dorsum of foot without edema wear stocking. Unable to use home pump as she has not heard from the rep about new pump sleeves.   Tawnya reported wearing compression outside of therapy visits: Yes   She was compliant with home exercise program given last session.   Response to previous treatment: no notable change  Functional change: none     Pain: did not quantify         OBJECTIVE      Pt arrived in NAD     Compression garment status: 20-30 mm hg knee highs, toe caps  Compression Rx status: requested from MD  Pneumatic pump status: referred by MD, not yet received     LE Girth Measurements: taken in supine  LANDMARK RIGHT LE  1/8/25 LEFT LE  1/8/25 LANDMARK RIGHT LE  2/3/25 LEFT LE  2/3/25 LEFT LE  2/17/25   SBP - -  - - -   10 below SBP 51.0 cm 51.0 cm Digit 5 DIP 5.5 cm 5.5 cm 5.5 cm   20 below SBP 48.0 cm 48.0 cm Digit 4 DIP 5.5 cm 5.5 cm 5.5 cm   30 below SBP 37.0 cm 37.0 cm Digit 3 DIP 5.5 cm 5.5 cm 5.5 cm   35 below SBP 29.0 cm 29.0 cm Digit 2 DIP 6.0 cm 6.5 cm 6.0 cm   Ankle 28.0 cm 29.0 cm Digit 1 DIP 9.25 cm 9.5 cm 9.0 cm   Forefoot 26.0 cm 27.0 cm Forefoot 25.5 cm 26.5 cm 25.0 cm     LANDMARK LLE   3/11/25   Digit 5 DIP -   Digit 4 DIP -   Digit 3 DIP  -   Digit 2 DIP 6.5 cm   Digit 1 DIP 9.5 cm   Forefoot 26.0 cm        Treatment      Tawnya received the treatments listed below:         Therapeutic activity techniques were applied for 55 minutes, including:    SEQUENTIAL COMPRESSION PUMP: full leg sleeve applied to LLE  Airos 6 with default setting with distal pressures starting at 45mmHg entire extremity simultaneous to education.         MULTILAYERED BANDAGING:  issued supplies and bandaged LLE  with elastomull and Komprex 2 to digits and dorsum of foot, cotton stockinette with edema wear size medium over base of toes to above ankle, 6 cm rosidal K bandage donned base of toes to ankle, to leave intact 24-72 hrs as tolerated, discontinue with any problems, return rolled bandages next session. Wash and wear schedules confirmed.       Patient was educated in   Compression needs: 20-30 mm hg knee highs and Toe caps  Home management plan:  Cost/coverage of compression garments: Medicare now pays for compression. Private insurance coverage is on a case-by-case basis. In order to determine coverage an Rx with a diagnosis of lymphedema is sent to a participating DME for a benefits check.   Commitment to attendance and securing compression needs are critical to lymphedema management  Pneumatic pump benefits, set-up, use, referral process, coverage through insurance. Coverage is based on insurance plan as well as if deductible has been met. Pneumatic pump is an added tool for managing lymphedema at home and is meant to be used in addition to wearing compression garments daily but does not substitute compression garments.           Patient Education and Home Exercises       Education provided:   - see above  - Progress towards goals      Written Home Exercises Provided: no        Assessment      Dorsum of foot more swollen today. Reapplied edema wear but with cotton stockinette liner under.     Pt has tried elevation, exercise, and compression for 4 weeks but  hyperplasia and swelling persist. A home pneumatic compression device is recommended for lifetime management.     Pt would continue to benefit from skilled OT.       Tawnya is progressing well towards her goals and there are no updates to goals at this time. Pt prognosis is Good.      Pt will continue to benefit from skilled outpatient occupational therapy to address the deficits listed in the problem list on initial evaluation provide pt/family education and to maximize pt's level of independence in the home and community environment.      Pt's spiritual, cultural and educational needs considered and pt agreeable to plan of care and goals.     Anticipated barriers to occupational therapy: none     Goals:  Short Term Goals: (2 weeks)  Goals: Progressing / MET   1. Patient will demonstrate 100% knowledge of lymphedema precautions and signs of infection to allow for reduced lymphedema risk, infection risk, and/or exacerbation of condition.  NOT MET   2. Patient will tolerate daily activities wearing multilayered bandaging to allow for lymphatic drainage support, skin elasticity, and reduction in shape and size of limb.  NOT MET   3. Patient and/or caregiver will order/obtain appropriate compression garments to maintain lymphatic and venous support.  NOT MET   4. Patient will show decreased total girth measurement in LLE by up to 0.5 cm to allow for lower extremity symmetry, shoe and clothing choice, and ability to apply needed compression. NOT MET      Long Term Goals: (4 weeks)  Goals: Progressing / MET   1. Patient and/or caregiver to lukas/doff compression garment independently and with daily compliance to assist in lymphedema management, skin elasticity, and tissue density NOT MET   2. Patient and/or caregiver will be independent in use of pneumatic compression pump or self manual lymphatic drainage techniques to areas within reach to enhance lymphatic drainage and skin condition.  NOT MET   3. Patient to be  independent and compliant with home exercise program to allow for increased function in affected limb. NOT MET   4. Patient will show decreased total girth measurement in LLE by up to 1.0 cm  to allow for lower extremity symmetry, shoe and clothing choice, and ability to apply needed compression daily. NOT MET            PLAN      Updates/Grading for next session: none     Latasha Marin, OT, CLWT

## 2025-03-28 ENCOUNTER — E-VISIT (OUTPATIENT)
Dept: INTERNAL MEDICINE | Facility: CLINIC | Age: 25
End: 2025-03-28
Payer: MEDICAID

## 2025-03-28 DIAGNOSIS — L30.9 DERMATITIS: Primary | ICD-10-CM

## 2025-03-28 NOTE — PROGRESS NOTES
Patient ID: Tawnya Sanz is a 24 y.o. female.    Chief Complaint: General Illness (Entered automatically based on patient selection in [facility].)    The patient initiated a request through [facility] on 3/28/2025 for evaluation and management with a chief complaint of General Illness (Entered automatically based on patient selection in [facility].)     I evaluated the questionnaire submission on 3/28/25.    [facility]-Skin Hair Nails    3/28/2025  7:00 AM CDT - Filed by Patient   What do you need help with? Skin   What concern do you have about your skin? Rash   Do you agree to participate in an E-Visit? Yes   If you have any of the following symptoms, please present to your local emergency room or call 911:  I acknowledge   Do you have any of the following pregnancy-related conditions? None   What is the main issue you would like addressed today? Rash around the umbilicus that developed 3-5 days ago   How would you describe your skin concern? Rash   When did your concern begin? 3/23/2025   Where is your skin concern located? Other   Where is your skin concern located? Umbilicus   Does the affected area itch? Yes   Does the affected area hurt? No   Does the affected area have discharge or drainage? No   Have you noticed any bleeding in the affected area? No   How would you describe your skin concern? Spots;  Streaks   How would you describe the color of the affected area(s)? Red   How has the affected area changed over time? Increased in size   How often do you have this skin concern? New problem   How long does your skin problem last? Weeks   Have you been exposed to any of the following? Insects/bugs   Have you used any of the following to treat your skin concern? Other   What have you used to treat your skin concern? Clotrimazole 1%   If you have used anything for treatment, has it helped the symptoms? No   Do you have any of the following additional symptoms with your skin concern? None    Provide any additional information you feel is important. I started off with 2 bug bites (you can see remnants of the further out one in the zoomed out pic), but has since turned into rash inside the umbilicus as well, tried antifungal for last 2 days, hasn't changed, don't know if it's bacterial, very itchy   At least one photo is required for treatment to be provided. You can upload a maximum of three photos of the affected area.     Are you able to take your vital signs? No         Encounter Diagnosis   Name Primary?    Dermatitis Yes      She did recently use neosporin. Possibly contact dermatitis?  Stop neosporin. Recommend triamcinolone. If not improving will need to evaluate in person.    No orders of the defined types were placed in this encounter.           No follow-ups on file.      E-Visit Time Trackin minuts

## 2025-04-01 ENCOUNTER — PATIENT MESSAGE (OUTPATIENT)
Dept: REHABILITATION | Facility: HOSPITAL | Age: 25
End: 2025-04-01
Payer: MEDICAID

## 2025-04-08 ENCOUNTER — PATIENT MESSAGE (OUTPATIENT)
Dept: INTERNAL MEDICINE | Facility: CLINIC | Age: 25
End: 2025-04-08
Payer: MEDICAID

## 2025-04-11 RX ORDER — TIRZEPATIDE 2.5 MG/.5ML
2.5 INJECTION, SOLUTION SUBCUTANEOUS
Qty: 4 PEN | Refills: 0 | Status: SHIPPED | OUTPATIENT
Start: 2025-04-11

## 2025-04-14 ENCOUNTER — TELEPHONE (OUTPATIENT)
Dept: INTERNAL MEDICINE | Facility: CLINIC | Age: 25
End: 2025-04-14
Payer: MEDICAID

## 2025-04-21 ENCOUNTER — PATIENT MESSAGE (OUTPATIENT)
Dept: INTERNAL MEDICINE | Facility: CLINIC | Age: 25
End: 2025-04-21
Payer: MEDICAID

## 2025-04-28 PROBLEM — I87.2 VENOUS INSUFFICIENCY OF BOTH LOWER EXTREMITIES: Status: ACTIVE | Noted: 2025-04-28

## 2025-05-08 ENCOUNTER — OFFICE VISIT (OUTPATIENT)
Dept: CARDIOLOGY | Facility: CLINIC | Age: 25
End: 2025-05-08
Payer: MEDICAID

## 2025-05-08 VITALS
BODY MASS INDEX: 47.09 KG/M2 | HEIGHT: 66 IN | DIASTOLIC BLOOD PRESSURE: 59 MMHG | HEART RATE: 89 BPM | WEIGHT: 293 LBS | SYSTOLIC BLOOD PRESSURE: 121 MMHG | OXYGEN SATURATION: 98 %

## 2025-05-08 DIAGNOSIS — R00.2 PALPITATIONS: ICD-10-CM

## 2025-05-08 DIAGNOSIS — I89.0 LYMPHEDEMA OF BOTH LOWER EXTREMITIES: Primary | ICD-10-CM

## 2025-05-08 DIAGNOSIS — E66.813 CLASS 3 SEVERE OBESITY DUE TO EXCESS CALORIES WITH SERIOUS COMORBIDITY AND BODY MASS INDEX (BMI) OF 50.0 TO 59.9 IN ADULT: ICD-10-CM

## 2025-05-08 DIAGNOSIS — I87.2 VENOUS INSUFFICIENCY OF BOTH LOWER EXTREMITIES: ICD-10-CM

## 2025-05-08 DIAGNOSIS — B35.1 ONYCHOMYCOSIS OF TOENAIL: ICD-10-CM

## 2025-05-08 DIAGNOSIS — E66.01 CLASS 3 SEVERE OBESITY DUE TO EXCESS CALORIES WITH SERIOUS COMORBIDITY AND BODY MASS INDEX (BMI) OF 50.0 TO 59.9 IN ADULT: ICD-10-CM

## 2025-05-08 PROCEDURE — 3074F SYST BP LT 130 MM HG: CPT | Mod: CPTII,,, | Performed by: INTERNAL MEDICINE

## 2025-05-08 PROCEDURE — 1159F MED LIST DOCD IN RCRD: CPT | Mod: CPTII,,, | Performed by: INTERNAL MEDICINE

## 2025-05-08 PROCEDURE — 3008F BODY MASS INDEX DOCD: CPT | Mod: CPTII,,, | Performed by: INTERNAL MEDICINE

## 2025-05-08 PROCEDURE — 3078F DIAST BP <80 MM HG: CPT | Mod: CPTII,,, | Performed by: INTERNAL MEDICINE

## 2025-05-08 PROCEDURE — 99214 OFFICE O/P EST MOD 30 MIN: CPT | Mod: S$PBB,,, | Performed by: INTERNAL MEDICINE

## 2025-05-08 PROCEDURE — 3044F HG A1C LEVEL LT 7.0%: CPT | Mod: CPTII,,, | Performed by: INTERNAL MEDICINE

## 2025-05-08 PROCEDURE — 99214 OFFICE O/P EST MOD 30 MIN: CPT | Mod: PBBFAC | Performed by: INTERNAL MEDICINE

## 2025-05-08 PROCEDURE — 99999 PR PBB SHADOW E&M-EST. PATIENT-LVL IV: CPT | Mod: PBBFAC,,, | Performed by: INTERNAL MEDICINE

## 2025-05-08 NOTE — PATIENT INSTRUCTIONS
Assessment/Plan:  Tawnya Sanz is a 24 y.o. female with MASLD, morbid obesity, who presents for a follow up appointment.     LLE Edema- Due to lymphedema and venous insufficiency. Improved and stable. Continue lymphedema clinic therapy techniques at home. Continue graduated compression hose.  Limit sodium intake to 2000 mg daily.  Limit volume intake to 1.5 L daily.  Elevate legs when resting.    2. Morbid Obesity- Encourage diet, exercise, and weight loss.    3. Palpitations- Check 30 day event monitor and echo.     Follow-up in 2 months

## 2025-05-08 NOTE — PROGRESS NOTES
[facility]      Chief Complaint   Patient presents with    Lymphedema of both lower extremities       Patient ID: Tawnya Sanz is a 24 y.o. female with MASLD, morbid obesity, who presents for a follow up appointment. Pertinent history/events are as follows:     -Pt kindly referred by Barb MACIAS for evaluation of localized swelling of left lower extremity .    -At our initial clinic visit on 11/7/2024, Ms. Sanz reports left leg swelling starting in 1/2022.  She has a bout of LLE cellulitis in 3/2022.  States leg swelling occurs daily.  No claudication symptoms or tissue loss. 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   Plan:  LLE Edema- Due to lymphedema and possible venous insufficiency. Check BLE venous reflux study and KASSIE study. Pt presents with findings consistent with mild lymphedema.  Refer to lymphedema clinic.  Recommend wearing graduated compression hose.  Limit sodium intake to 2000 mg daily.  Limit volume intake to 1.5 L daily.  Elevate legs when resting.  Morbid Obesity- Encourage diet, exercise, and weight loss.    -At follow up visit on 3/13/2025, Ms. Sanz reports some improvement left leg swelling. She has started lymphedema clinic therapy. She has no claudication symptoms or tissue loss. KASSIE Study on 11/14/2024 revealed normal resting ABIs bilaterally. BLE Venous Reflux Study 11/14/2024 demonstrated hemodynamically significant BLE venous reflux and no DVT.   Plan:  LLE Edema- Due to lymphedema and venous insufficiency. Improving. Continue lymphedema clinic therapy. KASSIE Study on 11/14/2024 revealed normal resting ABIs bilaterally. BLE Venous Reflux Study 11/14/2024 demonstrated hemodynamically significant BLE venous reflux and no DVT.  Recommend wearing graduated compression hose.  Limit sodium intake to 2000 mg daily.  Limit volume intake to 1.5 L daily.  Elevate legs when resting.  Morbid Obesity- Encourage diet, exercise, and weight  loss.    HPI:  Ms. Sanz reports left leg swelling is stable. She has completed lymphedema clinic therapy and continues to perform the techniques at home. She reports episodes of palpitation which have become more frequent recently.     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    Chronic sinusitis, unspecified     Obesity, unspecified      Past Surgical History:   Procedure Laterality Date    WISDOM TOOTH EXTRACTION       Social History     Socioeconomic History    Marital status: Single   Occupational History    Occupation: medical student   Tobacco Use    Smoking status: Never    Smokeless tobacco: Never   Substance and Sexual Activity    Alcohol use: Yes     Comment: socially 1 x month    Drug use: Never    Sexual activity: Never   Social History Narrative    Medical student at      Social Drivers of Health     Financial Resource Strain: Low Risk  (1/15/2024)    Overall Financial Resource Strain (CARDIA)     Difficulty of Paying Living Expenses: Not very hard   Food Insecurity: No Food Insecurity (1/15/2024)    Hunger Vital Sign     Worried About Running Out of Food in the Last Year: Never true     Ran Out of Food in the Last Year: Never true   Transportation Needs: No Transportation Needs (1/15/2024)    PRAPARE - Transportation     Lack of Transportation (Medical): No     Lack of Transportation (Non-Medical): No   Physical Activity: Unknown (3/13/2025)    Exercise Vital Sign     Days of Exercise per Week: 0 days   Stress: No Stress Concern Present (7/29/2024)    Received from [facility]    [facility] Occupational [facility] - Occupational Stress Questionnaire     Feeling of Stress : Only a little   Housing Stability: High Risk (1/15/2024)    Housing Stability Vital Sign     Unable to Pay for Housing in the Last Year: No     Number of Places Lived in the Last Year: 3     Unstable Housing in the Last Year: No     Family History   Problem Relation  Name Age of Onset    Cancer Mother          thyroid    Hypertension Mother      Heart failure Mother      Diabetes Mother      Cancer Father          skin - unsure type       Review of patient's allergies indicates:  No Known Allergies    Medication List with Changes/Refills   Current Medications    CICLOPIROX (PENLAC) 8 % SOLN    Apply over nail and surrounding skin NIGHTLY.  Apply daily over previous coat. After seven (7) days, may remove with alcohol and continue cycle.    CLINDAMYCIN PHOSPHATE (CLINDAGEL) 1 % GLQD    Apply 1 Application topically daily    CLOTRIMAZOLE (LOTRIMIN) 1 % CREAM    Apply topically 2 (two) times daily.    DOXYCYCLINE (VIBRA-TABS) 100 MG TABLET    Take 1 tablet (100 mg total) by mouth every 12 (twelve) hours.    FLUTICASONE PROPIONATE (FLONASE) 50 MCG/ACTUATION NASAL SPRAY    2 sprays by Nasal route daily    KETOCONAZOLE (NIZORAL) 2 % CREAM    Apply twice a day for two to three weeks until scaling has resolved. After resolved, continue treating for additional week.    MEDROXYPROGESTERONE (PROVERA) 10 MG TABLET    Take 1 tablet (10 mg total) by mouth once daily.    NYSTATIN (MYCOSTATIN) POWDER    Apply to affected area 3 times daily    PROPYLENE GLYCOL 0.6 % DROP    Apply to eye as needed.    TERBINAFINE HCL (LAMISIL) 1 % CREAM    Apply topically 2 (two) times daily.    TIRZEPATIDE, WEIGHT LOSS, (ZEPBOUND) 2.5 MG/0.5 ML PNIJ    Inject 2.5 mg into the skin every 7 days.    TRIAMCINOLONE ACETONIDE 0.1% (KENALOG) 0.1 % CREAM    Apply twice a day as needed for itchy raised skin.  Don't use on face, armpits, or groin.       Review of Systems  Constitution: Denies chills, fever, and sweats.  HENT: Denies headaches or blurry vision.  Cardiovascular: Denies chest pain or irregular heart beat.  Respiratory: Denies cough or shortness of breath.  Gastrointestinal: Denies abdominal pain, nausea, or vomiting.  Musculoskeletal: Denies muscle cramps.  Neurological: Denies dizziness or focal  "weakness.  Psychiatric/Behavioral: Normal mental status.  Hematologic/Lymphatic: Denies bleeding problem or easy bruising/bleeding.  Skin: Denies rash or suspicious lesions    Physical Examination  BP (!) 121/59   Pulse 89   Ht 5' 6" (1.676 m)   Wt (!) 152 kg (335 lb 1.6 oz)   SpO2 98%   BMI 54.09 kg/m²     Constitutional: No acute distress, conversant  HEENT: Sclera anicteric, Pupils equal, round and reactive to light, extraocular motions intact, Oropharynx clear  Neck: No JVD, no carotid bruits  Cardiovascular: regular rate and rhythm, no murmur, rubs or gallops, normal S1/S2  Pulmonary: Clear to auscultation bilaterally  Abdominal: Abdomen soft, nontender, nondistended, positive bowel sounds  Extremities: No lower extremity edema,   Pulses:  Carotid pulses are 2+ on the right side, and 2+ on the left side.  Radial pulses are 2+ on the right side, and 2+ on the left side.   Femoral pulses are 2+ on the right side, and 2+ on the left side.  Popliteal pulses are 2+ on the right side, and 2+ on the left side.   Dorsalis pedis pulses are 2+ on the right side, and 2+ on the left side.   Posterior tibial pulses are 2+ on the right side, and 2+ on the left side.    Skin: No ecchymosis, erythema, or ulcers  Psych: Alert and oriented x 3, appropriate affect  Neuro: CNII-XII intact, no focal deficits    Labs:  Most Recent Data  CBC:   Lab Results   Component Value Date    WBC 10.14 09/25/2024    HGB 12.7 09/25/2024    HCT 39.2 09/25/2024     09/25/2024    MCV 87 09/25/2024    RDW 12.7 09/25/2024     BMP:   Lab Results   Component Value Date     09/25/2024    K 3.7 09/25/2024     09/25/2024    CO2 24 09/25/2024    BUN 12 09/25/2024    CREATININE 0.7 09/25/2024    GLU 95 09/25/2024    CALCIUM 10.1 09/25/2024     LFTS;   Lab Results   Component Value Date    PROT 6.9 10/22/2024    ALBUMIN 3.7 10/22/2024    BILITOT 0.5 10/22/2024    AST 14 10/22/2024    ALKPHOS 58 10/22/2024    ALT 22 10/22/2024 " "    COAGS: No results found for: "INR", "PROTIME", "PTT"  FLP:   Lab Results   Component Value Date    CHOL 155 02/23/2024    HDL 53 02/23/2024    LDLCALC 91.6 02/23/2024    TRIG 52 02/23/2024    CHOLHDL 34.2 02/23/2024     CARDIAC: No results found for: "TROPONINI", "CKTOTAL", "CKMB", "BNP"    Imaging:    KASSIE Study 11/14/2024:    Normal resting ABIs bilaterally.    PVR waveforms are mildly dampened at the low thigh level bilaterally, and ankle level bilaterally.    BLE Venous Reflux Study 11/14/2024:    There is no evidence of a right lower extremity DVT.    The right common femoral vein has reflux.    The right greater saphenous vein has reflux.    There is no evidence of a left lower extremity DVT.    The left common femoral vein has reflux.    The left greater saphenous vein has reflux.     Assessment/Plan:  Tawnya Sanz is a 24 y.o. female with MASLD, morbid obesity, who presents for a follow up appointment.     LLE Edema- Due to lymphedema and venous insufficiency. Improved and stable. Continue lymphedema clinic therapy techniques at home. Continue graduated compression hose.  Limit sodium intake to 2000 mg daily.  Limit volume intake to 1.5 L daily.  Elevate legs when resting.    2. Morbid Obesity- Encourage diet, exercise, and weight loss.    3. Palpitations- Check 30 day event monitor and echo.     Follow-up in 2 months    Total duration of face to face visit time 20 minutes.  Total time spent counseling greater than fifty percent of total visit time.  Counseling included discussion regarding imaging findings, diagnosis, possibilities, treatment options, risks and benefits.  The patient had many questions regarding the options and long-term effects.    Bentley Pittman MD, PhD  Interventional Cardiology          "

## 2025-05-20 ENCOUNTER — HOSPITAL ENCOUNTER (OUTPATIENT)
Dept: CARDIOLOGY | Facility: HOSPITAL | Age: 25
Discharge: HOME | End: 2025-05-20
Attending: INTERNAL MEDICINE
Payer: MEDICAID

## 2025-05-20 VITALS — WEIGHT: 293 LBS | BODY MASS INDEX: 47.09 KG/M2 | HEART RATE: 72 BPM | HEIGHT: 66 IN

## 2025-05-20 DIAGNOSIS — R00.2 PALPITATIONS: ICD-10-CM

## 2025-05-20 PROCEDURE — 93306 TTE W/DOPPLER COMPLETE: CPT | Mod: 26,,, | Performed by: INTERNAL MEDICINE

## 2025-05-20 PROCEDURE — 93306 TTE W/DOPPLER COMPLETE: CPT

## 2025-05-21 LAB
AORTIC SIZE INDEX (SOV): 1 CM/M2
AORTIC SIZE INDEX: 1.1 CM/M2
ASCENDING AORTA: 2.8 CM
AV AREA BY CONTINUOUS VTI: 3.2 CM2
AV INDEX (PROSTH): 0.85
AV LVOT MEAN GRADIENT: 6 MMHG
AV LVOT PEAK GRADIENT: 9 MMHG
AV MEAN GRADIENT: 7 MMHG
AV PEAK GRADIENT: 10 MMHG
AV VALVE AREA BY VELOCITY RATIO: 3.6 CM²
AV VALVE AREA: 3.2 CM2
AV VELOCITY RATIO: 0.94
BSA FOR ECHO PROCEDURE: 2.66 M2
CV ECHO LV RWT: 0.26 CM
DOP CALC AO PEAK VEL: 1.6 M/S
DOP CALC AO VTI: 38.3 CM
DOP CALC LVOT AREA: 3.8 CM2
DOP CALC LVOT DIAMETER: 2.2 CM
DOP CALC LVOT PEAK VEL: 1.5 M/S
DOP CALC LVOT STROKE VOLUME: 123.9 CM3
DOP CALCLVOT PEAK VEL VTI: 32.6 CM
E WAVE DECELERATION TIME: 206 MS
E/A RATIO: 1.87
E/E' RATIO: 7 M/S
ECHO EF ESTIMATED: 62 %
ECHO LV POSTERIOR WALL: 0.6 CM (ref 0.6–1.1)
FRACTIONAL SHORTENING: 32.6 % (ref 28–44)
INTERVENTRICULAR SEPTUM: 0.8 CM (ref 0.6–1.1)
IVC DIAMETER: 0.88 CM
LA MAJOR: 6 CM
LA MINOR: 5.3 CM
LA WIDTH: 3.7 CM
LEFT ATRIUM SIZE: 3.9 CM
LEFT ATRIUM VOLUME INDEX: 28 ML/M2
LEFT ATRIUM VOLUME: 69 CM3
LEFT INTERNAL DIMENSION IN SYSTOLE: 3.1 CM (ref 2.1–4)
LEFT VENTRICLE DIASTOLIC VOLUME INDEX: 38.55 ML/M2
LEFT VENTRICLE DIASTOLIC VOLUME: 96 ML
LEFT VENTRICLE MASS INDEX: 39.9 G/M2
LEFT VENTRICLE SYSTOLIC VOLUME INDEX: 14.9 ML/M2
LEFT VENTRICLE SYSTOLIC VOLUME: 37 ML
LEFT VENTRICULAR INTERNAL DIMENSION IN DIASTOLE: 4.6 CM (ref 3.5–6)
LEFT VENTRICULAR MASS: 99.3 G
LV LATERAL E/E' RATIO: 6.1
LV SEPTAL E/E' RATIO: 8.6
MV A" WAVE DURATION": 85.63 MS
MV PEAK A VEL: 0.55 M/S
MV PEAK E VEL: 1.03 M/S
OHS CV RV/LV RATIO: 1.02 CM
PISA TR MAX VEL: 2.6 M/S
PULM VEIN A" WAVE DURATION": 85.63 MS
PULM VEIN S/D RATIO: 1.11
PULMONIC VEIN PEAK A VELOCITY: 0.3 M/S
PV PEAK D VEL: 0.57 M/S
PV PEAK S VEL: 0.63 M/S
RA MAJOR: 4.8 CM
RA PRESSURE ESTIMATED: 3 MMHG
RA WIDTH: 3.9 CM
RIGHT ATRIAL AREA: 17.1 CM2
RIGHT VENTRICLE DIASTOLIC BASEL DIMENSION: 4.7 CM
RV TB RVSP: 6 MMHG
RV TISSUE DOPPLER FREE WALL SYSTOLIC VELOCITY 1 (APICAL 4 CHAMBER VIEW): 12.65 CM/S
SINUS: 2.57 CM
STJ: 2.3 CM
TDI LATERAL: 0.17 M/S
TDI SEPTAL: 0.12 M/S
TDI: 0.15 M/S
TRICUSPID ANNULAR PLANE SYSTOLIC EXCURSION: 2.6 CM
TV PEAK GRADIENT: 26 MMHG
TV REST PULMONARY ARTERY PRESSURE: 30 MMHG
Z-SCORE OF LEFT VENTRICULAR DIMENSION IN END DIASTOLE: -10.82
Z-SCORE OF LEFT VENTRICULAR DIMENSION IN END SYSTOLE: -7.55

## 2025-05-22 ENCOUNTER — TELEPHONE (OUTPATIENT)
Dept: ENDOCRINOLOGY | Facility: CLINIC | Age: 25
End: 2025-05-22
Payer: MEDICAID

## 2025-05-22 NOTE — TELEPHONE ENCOUNTER
Returned patient's call back about getting scheduled.  Patient does not have a referral. Patient needs a referral.

## 2025-05-23 ENCOUNTER — TELEPHONE (OUTPATIENT)
Dept: CARDIOLOGY | Facility: CLINIC | Age: 25
End: 2025-05-23
Payer: MEDICAID

## 2025-05-23 NOTE — TELEPHONE ENCOUNTER
Lymphedema Pump Progress:  [x] Consult, clinical notes, and face sheet faxed to [employer] for home pneumatic compression.  [x] Reached out to patient for follow up. Wish to inquire about symptoms of lymphedema and if conservative therapy has been effective.  [x] Updated progress note sent to [employer].  [x] Patient pneumatic compression pump approved and shipped by [employer].

## 2025-06-19 ENCOUNTER — CLINICAL SUPPORT (OUTPATIENT)
Dept: REHABILITATION | Facility: HOSPITAL | Age: 25
End: 2025-06-19
Payer: MEDICAID

## 2025-06-19 DIAGNOSIS — I89.0 LYMPHEDEMA OF BOTH LOWER EXTREMITIES: Primary | ICD-10-CM

## 2025-06-19 DIAGNOSIS — L03.116 CELLULITIS OF LEFT LOWER EXTREMITY: ICD-10-CM

## 2025-06-19 PROCEDURE — 97530 THERAPEUTIC ACTIVITIES: CPT

## 2025-06-19 NOTE — PROGRESS NOTES
Outpatient Rehab    Occupational Therapy Discharge    Patient Name: Tawnya Sanz  MRN: [id number]  YOB: 2000  Encounter Date: 6/19/2025    Therapy Diagnosis:   Encounter Diagnoses   Name Primary?    Lymphedema of both lower extremities Yes    Cellulitis of left lower extremity      Physician: Bentley Pittman MD*    Physician Orders: Eval and Treat  Medical Diagnosis: Lymphedema, not elsewhere classified  Surgical Diagnosis: Not applicable for this Episode   Surgical Date: Not applicable for this Episode  Days Since Last Surgery: Not applicable for this Episode    Visit # / Visits Authorized: 14 / 20  Insurance Authorization Period: 1/22/2025 to 8/1/2025  Date of Evaluation: 1/8/2025  Plan of Care Certification:       Time In: 1437   Time Out: 1530  Total Time (in minutes): 53   Total Billable Time (in minutes): 53         Subjective   She has not used the new pump due to busy schedule. She noticed increased swelling on days she did not wear toe caps so they are helping but swelling perists..  Pain reported as 0/10.      Objective          LE Girth Measurements: taken in supine  LANDMARK RIGHT LE  1/8/25 LEFT LE  1/8/25 LANDMARK RIGHT LE  2/3/25 LEFT LE  2/3/25 LEFT LE  2/17/25   SBP - -   - - -   10 below SBP 51.0 cm 51.0 cm Digit 5 DIP 5.5 cm 5.5 cm 5.5 cm   20 below SBP 48.0 cm 48.0 cm Digit 4 DIP 5.5 cm 5.5 cm 5.5 cm   30 below SBP 37.0 cm 37.0 cm Digit 3 DIP 5.5 cm 5.5 cm 5.5 cm   35 below SBP 29.0 cm 29.0 cm Digit 2 DIP 6.0 cm 6.5 cm 6.0 cm   Ankle 28.0 cm 29.0 cm Digit 1 DIP 9.25 cm 9.5 cm 9.0 cm   Forefoot 26.0 cm 27.0 cm Forefoot 25.5 cm 26.5 cm 25.0 cm      LANDMARK LLE   3/11/25 LLE  6/19/25   Digit 5 DIP - -   Digit 4 DIP - -   Digit 3 DIP - -   Digit 2 DIP 6.5 cm 6.0 cm   Digit 1 DIP 9.5 cm 9.0 cm   Forefoot 26.0 cm 26.0 cm       Treatment:  Therapeutic Activity  TA 1: MANUAL LYMPHATIC DRAINAGE (MLD):    While supine, stimulation at terminus, inguinal and popliteal lymph  nodes, drainage of entire LLE targeting digits and dorsum of foot with return proximally.  TA 2: MULTILAYERED BANDAGING:  issued supplies and bandaged LLE  with elastomull and Komprex 2 to digits and dorsum of foot, edema wear stockinette base of toes to above ankle, 6 cm and 10 cm rosidal K bandage donned base of toes to below knee to leave intact 24-72 hrs as tolerated, discontinue with any problems, return rolled bandages next session. Wash and wear schedules confirmed.  TA 3: See education section.    Time Entry(in minutes):  Therapeutic Activity Time Entry: 53    Assessment & Plan   Assessment: Swelling persists in digits and dorsum of foot in current garment. Jobst rep is donating custom flat knit knee high and toe caps! Pt is to be fitted for a class 1 open toe knee high and a class 2 toe cap flat knit garment [facility] [place]. She is to continue home pump daily. She is being transferred to lymphedema PT Cora Shay for continued therapy. All questions and concerns addressed.   Evaluation/Treatment Tolerance: Patient tolerated treatment well    The patient's spiritual, cultural, and educational needs were considered, and the patient is agreeable to the plan of care and goals.     Education  Education was done with Patient. The patient's learning style includes Demonstration and Listening. The patient Demonstrates understanding and Verbalizes understanding.          Patient was educated in  Compression needs: 15-20 mm hg knee highs and 20-30 mm hg Toe caps Home management plan: Wear schedule: [place] first thing in the morning, remove at the end of the day as close to bedtime as possible. Do not sleep in garments. If using a home pneumatic pump, remove garments when using pump. If it is not yet bedtime, resume wearing garments until bed. Donning/doffing techniques Wash and management of products Cost/coverage of compression garments: Medicare now pays for compression. Private insurance coverage is on a case-by-case basis.  In order to determine coverage an Rx with a diagnosis of lymphedema is sent to a participating DME for a benefits check.  Edema wear as temporary compression garment until able to secure long term compression needs Commitment to attendance and securing compression needs are critical to lymphedema management Pneumatic pump benefits, set-up, use, referral process, coverage through insurance. Coverage is based on insurance plan as well as if deductible has been met. Pneumatic pump is an added tool for managing lymphedema at home and is meant to be used in addition to wearing compression garments daily but does not substitute compression garments.  Seek new referral if edema status worsens or changes in the future despite compliance with home management techniques.  Monitor for signs/symptoms of infection and seek medical attention immediately if symptoms occur. Do not need to return to therapy for new compression garments. PCP/referring provider can re-order compression garment Rx. Rx must include i89.0 lymphedema dx code. DME requires a face-to-face provider appointment within 6 months with i89.0 lymphedema dx code and medical justification statement for compression garments.         Plan: Transfer to PT.    Goals:     Short Term Goals: (2 weeks)  Goals: Progressing / MET   1. Patient will demonstrate 100% knowledge of lymphedema precautions and signs of infection to allow for reduced lymphedema risk, infection risk, and/or exacerbation of condition.  MET   2. Patient will tolerate daily activities wearing multilayered bandaging to allow for lymphatic drainage support, skin elasticity, and reduction in shape and size of limb.  MET   3. Patient and/or caregiver will order/obtain appropriate compression garments to maintain lymphatic and venous support.  Ongoing   4. Patient will show decreased total girth measurement in LLE by up to 0.5 cm to allow for lower extremity symmetry, shoe and clothing choice, and ability to  apply needed compression. NOT MET      Long Term Goals: (4 weeks)  Goals: Progressing / MET   1. Patient and/or caregiver to lukas/doff compression garment independently and with daily compliance to assist in lymphedema management, skin elasticity, and tissue density MET   2. Patient and/or caregiver will be independent in use of pneumatic compression pump or self manual lymphatic drainage techniques to areas within reach to enhance lymphatic drainage and skin condition.  Ongoing   3. Patient to be independent and compliant with home exercise program to allow for increased function in affected limb. MET   4. Patient will show decreased total girth measurement in LLE by up to 1.0 cm  to allow for lower extremity symmetry, shoe and clothing choice, and ability to apply needed compression daily. NOT MET       Latasha Marin, OT

## 2025-06-20 ENCOUNTER — PATIENT MESSAGE (OUTPATIENT)
Dept: REHABILITATION | Facility: HOSPITAL | Age: 25
End: 2025-06-20
Payer: MEDICAID

## 2025-06-24 ENCOUNTER — PATIENT MESSAGE (OUTPATIENT)
Dept: REHABILITATION | Facility: HOSPITAL | Age: 25
End: 2025-06-24
Payer: MEDICAID

## 2025-06-24 ENCOUNTER — TELEPHONE (OUTPATIENT)
Dept: CARDIOLOGY | Facility: CLINIC | Age: 25
End: 2025-06-24
Payer: MEDICAID

## 2025-06-24 DIAGNOSIS — I89.0 LYMPHEDEMA OF BOTH LOWER EXTREMITIES: Primary | ICD-10-CM

## 2025-06-24 DIAGNOSIS — I87.2 VENOUS INSUFFICIENCY OF BOTH LOWER EXTREMITIES: ICD-10-CM

## 2025-06-26 ENCOUNTER — E-VISIT (OUTPATIENT)
Dept: INTERNAL MEDICINE | Facility: CLINIC | Age: 25
End: 2025-06-26
Payer: MEDICAID

## 2025-06-26 ENCOUNTER — HOSPITAL ENCOUNTER (OUTPATIENT)
Dept: RADIOLOGY | Facility: HOSPITAL | Age: 25
Discharge: HOME | End: 2025-06-26
Attending: INTERNAL MEDICINE
Payer: MEDICAID

## 2025-06-26 ENCOUNTER — CLINICAL SUPPORT (OUTPATIENT)
Dept: CARDIOLOGY | Facility: HOSPITAL | Age: 25
End: 2025-06-26
Attending: INTERNAL MEDICINE
Payer: MEDICAID

## 2025-06-26 DIAGNOSIS — M79.672 LEFT FOOT PAIN: ICD-10-CM

## 2025-06-26 DIAGNOSIS — M25.561 CHRONIC PAIN OF RIGHT KNEE: Primary | ICD-10-CM

## 2025-06-26 DIAGNOSIS — G89.29 CHRONIC PAIN OF RIGHT KNEE: ICD-10-CM

## 2025-06-26 DIAGNOSIS — M25.561 CHRONIC PAIN OF RIGHT KNEE: ICD-10-CM

## 2025-06-26 DIAGNOSIS — G89.29 CHRONIC PAIN OF RIGHT KNEE: Primary | ICD-10-CM

## 2025-06-26 DIAGNOSIS — R00.2 PALPITATIONS: ICD-10-CM

## 2025-06-26 PROCEDURE — 73562 X-RAY EXAM OF KNEE 3: CPT | Mod: 26,RT,, | Performed by: RADIOLOGY

## 2025-06-26 PROCEDURE — 73630 X-RAY EXAM OF FOOT: CPT | Mod: TC,PN,LT

## 2025-06-26 PROCEDURE — 73560 X-RAY EXAM OF KNEE 1 OR 2: CPT | Mod: TC,PN,LT

## 2025-06-26 PROCEDURE — 73630 X-RAY EXAM OF FOOT: CPT | Mod: 26,LT,, | Performed by: RADIOLOGY

## 2025-06-26 NOTE — PROGRESS NOTES
Patient ID: Tawnya Sanz is a 24 y.o. female.        E-Visit Time Tracking:   Day 1 Time (in minutes): 5  Total Time (in minutes): 5      Chief Complaint: General Illness (Entered automatically based on patient selection in [employer].)      The patient initiated a request through [employer] on 6/26/2025 for evaluation and management with a chief complaint of General Illness (Entered automatically based on patient selection in [employer].)     I evaluated the questionnaire submission on 06/26/2025.    [facility] Peq Evisit [facility]-Muscle,Back,Joint    6/26/2025  9:02 AM CDT - Filed by Patient   What do you need help with? Knee Problem   Do you agree to participate in an E-Visit? Yes   If you have any of the following symptoms, please present to your local emergency room or call 911:  I acknowledge   Do you have any of the following pregnancy-related conditions? (Pregnant, Possibly pregnant, Breast feeding, None) None   What is the main issue you would like addressed today? Chronic R knee pain, pops when squatting, feels stiff, concerned for meniscal tear;  also have L heel pain that makes it difficult to weight bear sometimes but more concerned about the knee   Do you have any of the following? (New or worsening joint pain, Follow up after treatment change) New or worsening joint pain   Provide any information you feel is important to your history not asked above Sorry to bug you Dr. Rao, I'm on my ortho rotation right now and I was speaking to the staff about some of my symptoms and they thought it was worth reaching out to see about R knee MRI/L foot XR and referral to ortho   Please attach any relevant images or files    Are you able to take your vitals? No   Do you have a history of any of the following? (Bone infection, Fibromyalgia, Gout, Lupus, Paget's disease, Psoriasis, Rheumatoid arthritis, Sarcoidosis, Tendonitis, Wear-and-tear arthritis, None) None   What joint(s) are you having a problem with? (Shoulder,  Elbow, Wrist, Hand, Hip, Knee, Ankle, Foot, Toes) Knee;  Foot   Describe the exact location of the pain. R anterior knee pain, stiffness of entire joint, pops when squatting occasionally;  L heel pain particularly bad in morning, believe it's planter fasciitis but has been worsening in last several weeks to point where it is sometimes difficult to weight bear   What activities make your joint pain worse? (Bending over, Bending joint, Stairs, Cooking, Getting up, Lifting, Lying down, Overhead activities, Reaching, Running, Bathing, Sitting, Standing, Stretching, Walking, Other) Getting up from sitting or lying down;  Standing for long periods of time;  Other   Describe any activities that make your joint pain worse. Squatting   What have you used to help with the problem thus far? (Tylenol, Anti-inflammatory [ibuprofen, Aleve, Advil], Heat, Rest, Stretching of the joint, Cold or ice, Compression or wrap, Elevation, Immobilization or splinting) Elevation;  Rest   Has there been any change in your joint pain based on the treatment you have tried?(Improved, Unchanged, Worsened) Unchanged   Do you have any history of joint injuries, surgeries, or other medical conditions that may be related to your joint pain? No         Encounter Diagnoses   Name Primary?    Chronic pain of right knee Yes    Left foot pain         Orders Placed This Encounter   Procedures    X-ray Knee Ortho Right (XPD)     Standing Status:   Future     Expected Date:   6/26/2025     Expiration Date:   6/26/2026     May the Radiologist modify the order per protocol to meet the clinical needs of the patient?:   Yes    X-Ray Foot Complete Left     Standing Status:   Future     Expected Date:   6/26/2025     Expiration Date:   9/24/2025    Ambulatory referral/consult to Orthopedics     Standing Status:   Future     Expected Date:   7/3/2025     Expiration Date:   7/26/2026     Referral Priority:   Routine     Referral Type:   Consultation     Requested  Specialty:   Orthopedic Surgery     Number of Visits Requested:   1            No follow-ups on file.

## 2025-06-27 ENCOUNTER — OFFICE VISIT (OUTPATIENT)
Dept: SPORTS MEDICINE | Facility: CLINIC | Age: 25
End: 2025-06-27
Payer: MEDICAID

## 2025-06-27 ENCOUNTER — PATIENT MESSAGE (OUTPATIENT)
Dept: REHABILITATION | Facility: HOSPITAL | Age: 25
End: 2025-06-27
Payer: MEDICAID

## 2025-06-27 DIAGNOSIS — G89.29 CHRONIC PAIN OF RIGHT KNEE: Primary | ICD-10-CM

## 2025-06-27 DIAGNOSIS — M25.561 CHRONIC PAIN OF RIGHT KNEE: Primary | ICD-10-CM

## 2025-06-27 PROCEDURE — 99203 OFFICE O/P NEW LOW 30 MIN: CPT | Mod: S$PBB,,, | Performed by: ORTHOPAEDIC SURGERY

## 2025-06-27 PROCEDURE — 3044F HG A1C LEVEL LT 7.0%: CPT | Mod: CPTII,,, | Performed by: ORTHOPAEDIC SURGERY

## 2025-06-27 PROCEDURE — 99211 OFF/OP EST MAY X REQ PHY/QHP: CPT | Mod: PBBFAC,PN | Performed by: ORTHOPAEDIC SURGERY

## 2025-06-27 PROCEDURE — 99999 PR PBB SHADOW E&M-EST. PATIENT-LVL I: CPT | Mod: PBBFAC,,, | Performed by: ORTHOPAEDIC SURGERY

## 2025-06-27 NOTE — PROGRESS NOTES
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 She states that this pain has been a problem for the past 5 years but has recently gotten worse. She states that her right knee pain gets worse when squatting down or standing for to long .   She denies any treatment up to this point for this problem, but does state that she also has pain in her left foot that she has seen a doctor for. 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    Chronic sinusitis, unspecified     Obesity, unspecified      Past Surgical History:   Procedure Laterality Date    WISDOM TOOTH EXTRACTION       Family History   Problem Relation Name Age of Onset    Cancer Mother          thyroid    Hypertension Mother      Heart failure Mother      Diabetes Mother      Cancer Father          skin - unsure type     Social History[1]  Medication List with Changes/Refills   Current Medications    CHLORHEXIDINE (HIBICLENS) 4 % EXTERNAL LIQUID    Wash upper thighs once daily as needed for folliculitis    CICLOPIROX (PENLAC) 8 % SOLN    Apply over nail and surrounding skin NIGHTLY.  Apply daily over previous coat. After seven (7) days, may remove with alcohol and continue cycle.    CICLOPIROX (PENLAC) 8 % SOLN    Apply topically nightly     CLINDAMYCIN PHOSPHATE (CLINDAGEL) 1 % GLQD    Apply 1 Application topically daily    CLINDAMYCIN PHOSPHATE (CLINDAGEL) 1 % GLQD    Apply 1 Application topically daily To thighs    CLOTRIMAZOLE (LOTRIMIN) 1 % CREAM    Apply topically 2 (two) times daily.    DOXYCYCLINE (VIBRA-TABS) 100 MG TABLET    Take 1 tablet (100 mg total) by mouth every 12 (twelve) hours.    FLUTICASONE PROPIONATE (FLONASE) 50 MCG/ACTUATION NASAL SPRAY    2 sprays by Nasal route daily    KETOCONAZOLE (NIZORAL) 2 % CREAM    Apply twice a day for two to three weeks until scaling has resolved. After resolved, continue treating for additional week.    KETOCONAZOLE (NIZORAL) 2 % CREAM    Apply twice a day for two to three weeks until scaling has resolved. After resolved, continue treating for additional week.    KETOCONAZOLE (NIZORAL) 2 % SHAMPOO    Use as shampoo; allow to sit on scalp for 3-5 minutes then rinse.  Do this 2-3 times a week    MEDROXYPROGESTERONE (PROVERA) 10 MG TABLET    Take 1 tablet (10 mg total) by mouth once daily.    NYSTATIN (MYCOSTATIN) POWDER    Apply to affected area 3 times daily    PROPYLENE GLYCOL 0.6 % DROP    Apply to eye as needed.    TERBINAFINE HCL (LAMISIL) 1 % CREAM    Apply topically 2 (two) times daily.    TIRZEPATIDE, WEIGHT LOSS, (ZEPBOUND) 2.5 MG/0.5 ML PNIJ    Inject 2.5 mg into the skin every 7 days.    TRIAMCINOLONE ACETONIDE 0.1% (KENALOG) 0.1 % CREAM    Apply twice a day as needed for itchy raised skin.  Don't use on face, armpits, or groin.     Review of patient's allergies indicates:  No Known Allergies  ROS    Physical Exam:   There is no height or weight on file to calculate BMI.  There were no vitals filed for this visit.   GENERAL: Well appearing, appropriate for stated age, no acute distress.  CARDIOVASCULAR: Pulses regular by peripheral palpation.  PULMONARY: Respirations are even and non-labored.  NEURO: Awake, alert, and oriented x 3.  PSYCH: Mood & affect are appropriate.  HEENT: Head is  normocephalic and atraumatic.            Right Knee Exam     Tenderness   The patient is tender to palpation of the medial joint line.    Crepitus   The patient has crepitus of the patella.    Range of Motion   Extension:  -5 (-5) normal   Flexion:  120 normal     Tests   Meniscus   Lawson:   Lateral - negative  Ligament Examination   Lachman: normal (-1 to 2mm)     Comments:  Intact EHL, FHL, gastrocsoleus, and tibialis anterior. Sensation intact to light touch in superficial peroneal, deep peroneal, tibial, sural, and saphenous nerve distributions. Foot warm and well perfused with capillary refill of less than 2 seconds and palpable pedal pulses.      Muscle Strength   Right Lower Extremity   Quadriceps:  5/5   Hamstrin/5       Physical Exam                Imaging:    MRI Knee Without Contrast Right  Narrative: EXAMINATION:  MRI KNEE WITHOUT CONTRAST RIGHT    CLINICAL HISTORY:  Meniscal tear, untreated, new symptoms;Pain in right knee    TECHNIQUE:  Multiplanar, multisequence MRI of the right knee performed per routine protocol without contrast.    COMPARISON:  X-ray knee 2025    FINDINGS:  Menisci:  There is no tear of the medial or lateral meniscus.    Ligaments:  ACL, PCL, MCL, and LCL complex are intact.    Tendons:  Extensor mechanism is maintained.    Cartilage:    Patellofemoral: Partial-thickness chondral thinning noted over the median ridge of the patella.    Medial tibiofemoral: Articular cartilage is maintained.    Lateral tibiofemoral: Articular cartilage is maintained.    Bone: No fracture or marrow replacing process.    Miscellaneous: There is edema of the superolateral Hoffa's fat pad.  Note made of small joint effusion.  Impression: 1. No evidence for meniscus tear.  2. Partial-thickness chondral thinning over the patellar median ridge.  3. Superolateral Hoffa's fat pad edema, which may be seen with patellar tendon-lateral femoral condyle friction syndrome.    Electronically signed by  resident: Charity Victor  Date:    07/01/2025  Time:    09:31    Electronically signed by: Jose Shelton MD  Date:    07/01/2025  Time:    11:37      Relevant imaging results reviewed and interpreted by me, discussed with the patient and / or family today.     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    Encounter Diagnosis   Name Primary?    Chronic pain of right knee Yes        Plan:  MRI of right knee ordered for possible meniscus injury   Follow up after MRI to review results and discuss treatment options      Follow-up: Follow up after MRI . Please reach out to us sooner if there are any problems between now and then.    About Dr. Matias Salcedo's Research & Publications    Give us Feedback:   Google: Leave Google Review  Healthgrades: Leave Healthgrades Review       Provider Note/Medical Decision Making:     I discussed worrisome and red flag signs and symptoms with the patient. The patient expressed understanding and agreed to alert me immediately or to go to the emergency room if they experience any of these. Treatment plan was developed with input from the patient/family, and they expressed understanding and agreement with the plan. All questions were answered today.          Loki Salcedo MD  Board Certified in Orthopaedic Surgery & Sports Medicine   Regional Section Head of Orthopedic Surgery & Sports Medicine  [facility]      Disclaimer: This note was prepared using a voice recognition system and is likely to have sound alike errors within the text.     This note was generated with the  assistance of ambient listening technology. Verbal consent was obtained by the patient and accompanying visitor(s) for the recording of patient appointment to facilitate this note. I attest to having reviewed and edited the generated note for accuracy, though some syntax or spelling errors may persist. Please contact the author of this note for any clarification.             [1]   Social History  Socioeconomic History    Marital status: Single   Occupational History    Occupation: medical student   Tobacco Use    Smoking status: Never    Smokeless tobacco: Never   Substance and Sexual Activity    Alcohol use: Yes     Comment: socially 1 x month    Drug use: Never    Sexual activity: Never   Social History Narrative    Medical student at      Social Drivers of Health     Financial Resource Strain: Low Risk  (1/15/2024)    Overall Financial Resource Strain (CARDIA)     Difficulty of Paying Living Expenses: Not very hard   Food Insecurity: No Food Insecurity (1/15/2024)    Hunger Vital Sign     Worried About Running Out of Food in the Last Year: Never true     Ran Out of Food in the Last Year: Never true   Transportation Needs: No Transportation Needs (1/15/2024)    PRAPARE - Transportation     Lack of Transportation (Medical): No     Lack of Transportation (Non-Medical): No   Physical Activity: Unknown (3/13/2025)    Exercise Vital Sign     Days of Exercise per Week: 0 days   Stress: No Stress Concern Present (7/29/2024)    Received from [facility]    [facility] of Occupational Health - Occupational Stress Questionnaire     Feeling of Stress : Only a little   Housing Stability: High Risk (1/15/2024)    Housing Stability Vital Sign     Unable to Pay for Housing in the Last Year: No     Number of Places Lived in the Last Year: 3     Unstable Housing in the Last Year: No

## 2025-06-27 NOTE — PATIENT INSTRUCTIONS
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    Encounter Diagnosis   Name Primary?    Chronic pain of right knee Yes        Plan:  MRI of right knee ordered for possible meniscus injury   Follow up after MRI to review results and discuss treatment options      Follow-up: Follow up after MRI . Please reach out to us sooner if there are any problems between now and then.    About Dr. Matias Salcedo's Research & Publications    Give us Feedback:   Google: Leave Google Review  Healthgrades: Leave Healthgrades Review

## 2025-06-27 NOTE — PROGRESS NOTES
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    Chronic sinusitis, unspecified     Obesity, unspecified      Past Surgical History:   Procedure Laterality Date    WISDOM TOOTH EXTRACTION       Family History   Problem Relation Name Age of Onset    Cancer Mother          thyroid    Hypertension Mother      Heart failure Mother      Diabetes Mother      Cancer Father          skin - unsure type     Social History[1]  Medication List with Changes/Refills   Current Medications    CHLORHEXIDINE (HIBICLENS) 4 % EXTERNAL LIQUID    Wash upper thighs once daily as needed for folliculitis    CICLOPIROX (PENLAC) 8 % SOLN    Apply over nail and surrounding skin NIGHTLY.  Apply daily over previous coat. After seven (7) days, may remove with alcohol and continue cycle.    CICLOPIROX (PENLAC) 8 % SOLN    Apply topically nightly    CLINDAMYCIN PHOSPHATE (CLINDAGEL) 1 % GLQD    Apply 1 Application topically daily    CLINDAMYCIN PHOSPHATE (CLINDAGEL) 1 % GLQD    Apply 1 Application topically daily To thighs    CLOTRIMAZOLE (LOTRIMIN) 1 % CREAM    Apply topically 2 (two) times daily.    DOXYCYCLINE (VIBRA-TABS) 100 MG TABLET    Take 1 tablet (100 mg total) by mouth every 12 (twelve) hours.    FLUTICASONE PROPIONATE (FLONASE) 50 MCG/ACTUATION NASAL SPRAY    2 sprays by Nasal route daily    KETOCONAZOLE (NIZORAL) 2 % CREAM    Apply twice a day for two to  three weeks until scaling has resolved. After resolved, continue treating for additional week.    KETOCONAZOLE (NIZORAL) 2 % CREAM    Apply twice a day for two to three weeks until scaling has resolved. After resolved, continue treating for additional week.    KETOCONAZOLE (NIZORAL) 2 % SHAMPOO    Use as shampoo; allow to sit on scalp for 3-5 minutes then rinse.  Do this 2-3 times a week    MEDROXYPROGESTERONE (PROVERA) 10 MG TABLET    Take 1 tablet (10 mg total) by mouth once daily.    NYSTATIN (MYCOSTATIN) POWDER    Apply to affected area 3 times daily    PROPYLENE GLYCOL 0.6 % DROP    Apply to eye as needed.    TERBINAFINE HCL (LAMISIL) 1 % CREAM    Apply topically 2 (two) times daily.    TIRZEPATIDE, WEIGHT LOSS, (ZEPBOUND) 2.5 MG/0.5 ML PNIJ    Inject 2.5 mg into the skin every 7 days.    TRIAMCINOLONE ACETONIDE 0.1% (KENALOG) 0.1 % CREAM    Apply twice a day as needed for itchy raised skin.  Don't use on face, armpits, or groin.     Review of patient's allergies indicates:  No Known Allergies  ROS    Physical Exam:   There is no height or weight on file to calculate BMI.  There were no vitals filed for this visit.   GENERAL: Well appearing, appropriate for stated age, no acute distress.  CARDIOVASCULAR: Pulses regular by peripheral palpation.  PULMONARY: Respirations are even and non-labored.  NEURO: Awake, alert, and oriented x 3.  PSYCH: Mood & affect are appropriate.  HEENT: Head is normocephalic and atraumatic.  Ortho/SPM Exam    Physical Exam                Imaging:    X-ray Knee Ortho Right (XPD)  Narrative: EXAM:  XR KNEE ORTHO RIGHT (XPD)    CLINICAL HISTORY: [M25.561]-Pain in right knee./[G89.29]-Other chronic pain.    FINDINGS: 4 views right knee.  3 views left knee     No fracture is demonstrated.  Joint alignment is anatomic.  No significant joint effusion.  The joint spaces appear relatively well maintained.  Impression:  No significant radiographic abnormality of the right knee.    Finalized on:  6/26/2025 8:19 PM By:  Anselmo Dimas MD  [facility]# [id number]      2025-06-26 20:22:09.341     [facility]  X-Ray Foot Complete Left  Narrative: EXAM: XR FOOT COMPLETE 3 VIEW LEFT    CLINICAL HISTORY: Left foot pain.    FINDINGS: 3 views.  No fracture is demonstrated.  Joint alignment is anatomic.  No significant arthritic changes are present.  Plantar calcaneal enthesophyte.  Impression:  No acute radiographic abnormality of the left foot.    Finalized on: 6/26/2025 8:19 PM By:  Anselmo Dimas MD  [facility]# [id number]      2025-06-26 20:21:49.338     [facility]      Relevant imaging results reviewed and interpreted by me, discussed with the patient and / or family today. 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    Encounter Diagnosis   Name Primary?    Chronic pain of right knee Yes        Plan:  MRI of right knee ordered for possible meniscus injury   Follow up after MRI to review results and discuss treatment options      Follow-up: Follow up after MRI . Please reach out to us sooner if there are any problems between now and then.    About Dr. Matias Salcedo's Research & Publications    Give us Feedback:   Google: Leave Google Review  Healthgrades: Leave Healthgrades Review       Provider Note/Medical Decision Making: ***    I discussed worrisome and red flag signs and symptoms with the patient. The patient expressed understanding and agreed to alert me immediately or to go to the emergency room if they experience any of these. Treatment plan was developed with input from the patient/family, and they expressed understanding and agreement with the plan. All questions were  answered today.          Loki Salcedo MD  Board Certified in Orthopaedic Surgery & Sports Medicine   Regional Section Head of Orthopedic Surgery & Sports Medicine  [facility]      Disclaimer: This note was prepared using a voice recognition system and is likely to have sound alike errors within the text.     This note was generated with the assistance of ambient listening technology. Verbal consent was obtained by the patient and accompanying visitor(s) for the recording of patient appointment to facilitate this note. I attest to having reviewed and edited the generated note for accuracy, though some syntax or spelling errors may persist. Please contact the author of this note for any clarification.           [1]   Social History  Socioeconomic History    Marital status: Single   Occupational History    Occupation: medical student   Tobacco Use    Smoking status: Never    Smokeless tobacco: Never   Substance and Sexual Activity    Alcohol use: Yes     Comment: socially 1 x month    Drug use: Never    Sexual activity: Never   Social History Narrative    Medical student at      Social Drivers of Health     Financial Resource Strain: Low Risk  (1/15/2024)    Overall Financial Resource Strain (CARDIA)     Difficulty of Paying Living Expenses: Not very hard   Food Insecurity: No Food Insecurity (1/15/2024)    Hunger Vital Sign     Worried About Running Out of Food in the Last Year: Never true     Ran Out of Food in the Last Year: Never true   Transportation Needs: No Transportation Needs (1/15/2024)    PRAPARE - Transportation     Lack of Transportation (Medical): No     Lack of Transportation (Non-Medical): No   Physical Activity: Unknown (3/13/2025)    Exercise Vital Sign     Days of Exercise per Week: 0 days   Stress: No Stress Concern Present (7/29/2024)    Received from [facility] of Occupational Health - Occupational  Stress Questionnaire     Feeling of Stress : Only a little   Housing Stability: High Risk (1/15/2024)    Housing Stability Vital Sign     Unable to Pay for Housing in the Last Year: No     Number of Places Lived in the Last Year: 3     Unstable Housing in the Last Year: No

## 2025-07-01 ENCOUNTER — HOSPITAL ENCOUNTER (OUTPATIENT)
Dept: RADIOLOGY | Facility: HOSPITAL | Age: 25
Discharge: HOME | End: 2025-07-01
Attending: ORTHOPAEDIC SURGERY
Payer: MEDICAID

## 2025-07-01 DIAGNOSIS — M25.561 CHRONIC PAIN OF RIGHT KNEE: ICD-10-CM

## 2025-07-01 DIAGNOSIS — G89.29 CHRONIC PAIN OF RIGHT KNEE: ICD-10-CM

## 2025-07-01 PROCEDURE — 73721 MRI JNT OF LWR EXTRE W/O DYE: CPT | Mod: TC,RT

## 2025-07-01 PROCEDURE — 73721 MRI JNT OF LWR EXTRE W/O DYE: CPT | Mod: 26,RT,, | Performed by: RADIOLOGY

## 2025-07-25 ENCOUNTER — OFFICE VISIT (OUTPATIENT)
Dept: OBSTETRICS AND GYNECOLOGY | Facility: CLINIC | Age: 25
End: 2025-07-25
Payer: MEDICAID

## 2025-07-25 VITALS
BODY MASS INDEX: 47.09 KG/M2 | HEIGHT: 66 IN | WEIGHT: 293 LBS | SYSTOLIC BLOOD PRESSURE: 115 MMHG | DIASTOLIC BLOOD PRESSURE: 44 MMHG

## 2025-07-25 DIAGNOSIS — Z01.419 WELL WOMAN EXAM WITH ROUTINE GYNECOLOGICAL EXAM: Primary | ICD-10-CM

## 2025-07-25 PROCEDURE — 99999 PR PBB SHADOW E&M-EST. PATIENT-LVL III: CPT | Mod: PBBFAC,,, | Performed by: OBSTETRICS & GYNECOLOGY

## 2025-07-25 PROCEDURE — 99213 OFFICE O/P EST LOW 20 MIN: CPT | Mod: PBBFAC | Performed by: OBSTETRICS & GYNECOLOGY

## 2025-07-25 NOTE — PROGRESS NOTES
"Past medical, surgical, social, family, and obstetric histories; medications; prior records and results; and available outside records were reviewed and updated in the EMR.  Pertinent findings were noted below.    Reason for Visit   Well Woman    HPI   24 y.o. 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 No  Desires testing? No  Contraception: None  HPV vaccine: Yes    Skin tag on vulva has gotten bigger?    Pap:  No recent documented pap  Mammogram:  N/A  Allergies: Patient has no known allergies.    Review of Systems   Constitutional:  Negative for chills and fever.   Eyes:  Negative for visual disturbance.   Respiratory:  Negative for cough and shortness of breath.    Cardiovascular:  Negative for chest pain and leg swelling.   Gastrointestinal:  Negative for abdominal pain, nausea and vomiting.   Genitourinary:  Positive for menstrual problem. Negative for dysuria, flank pain, frequency, urgency and vaginal bleeding.   Integumentary:  Negative for breast mass.   Neurological:  Negative for syncope and headaches.   Psychiatric/Behavioral:  Negative for depression. The patient is not nervous/anxious.    All other systems reviewed and are negative.  Breast: Positive for mastodynia.Negative for mass      Exam   BP (!) 115/44 (Patient Position: Sitting)   Ht 5' 6" (1.676 m)   Wt (!) 150.5 kg (331 lb 12.7 oz)   LMP " 07/18/2025   BMI 53.55 kg/m²     Physical Exam  Constitutional:       General: She is not in acute distress.     Appearance: Normal appearance. She is obese. She is not ill-appearing.     Genitourinary:    Vulva normal.      Genitourinary Comments: 0.5cm skin tag, no erythema   Breasts:     Breasts are asymmetrical (Right larger than left).      Breasts are soft.     Right: No mass, nipple discharge, skin change or tenderness.      Left: No mass, nipple discharge, skin change or tenderness.     HENT:      Head: Normocephalic and atraumatic.   Pulmonary:      Effort: Pulmonary effort is normal.   Abdominal:      Palpations: Abdomen is soft. There is no mass.      Tenderness: There is no abdominal tenderness.   Musculoskeletal:         General: Normal range of motion.   Lymphadenopathy:      Upper Body:      Right upper body: No axillary adenopathy.      Left upper body: No axillary adenopathy.   Neurological:      General: No focal deficit present.      Mental Status: She is alert and oriented to person, place, and time.   Psychiatric:         Mood and Affect: Mood normal.         Behavior: Behavior normal.         Thought Content: Thought content normal.         Judgment: Judgment normal.   Vitals reviewed.       Assessment and Plan   Well woman exam with routine gynecological exam      Annual exam  Breast and pelvic exam: performed today (internal pelvic exam deferred), see above. R suspensory ligaments of breast likely contributing to pains, encouraged well supporting bra  Patient was counseled on ASCCP guidelines for cervical cytology screening  Cervical screening: declines due to never been SA  Patient was counseled on current recommendations for breast cancer screening  Mammogram screening: @ 41yo unless new risk factors warrant earlier screening  VitD/Ca supplementation recommendations based on age:  <50yoa - Ca 1000mg/day, VitD 600IU/day  51-70yoa - Ca 1200mg/day, VitD 600IU/day  >71yoa - Ca 1200mg/day, VitD  800IU/day  STD testing: declines due to never been SA  Contraception: declines due to never been SA  Offered patient pelvic US vs EUA with pap/EmBx/IUD/skin tag removal for most recent abnormal cycle. She does have risk factors for EmCa. Patient will let us know if bleeding between cycles occurs again.     She was counseled to follow up with her PCP for other routine health maintenance

## 2025-07-28 ENCOUNTER — PATIENT MESSAGE (OUTPATIENT)
Dept: CARDIOLOGY | Facility: CLINIC | Age: 25
End: 2025-07-28
Payer: MEDICAID

## 2025-07-29 ENCOUNTER — PATIENT MESSAGE (OUTPATIENT)
Dept: SPORTS MEDICINE | Facility: CLINIC | Age: 25
End: 2025-07-29
Payer: MEDICAID

## 2025-07-29 ENCOUNTER — TELEPHONE (OUTPATIENT)
Dept: SPORTS MEDICINE | Facility: CLINIC | Age: 25
End: 2025-07-29
Payer: MEDICAID

## 2025-07-29 ENCOUNTER — OFFICE VISIT (OUTPATIENT)
Dept: CARDIOLOGY | Facility: CLINIC | Age: 25
End: 2025-07-29
Payer: MEDICAID

## 2025-07-29 VITALS
OXYGEN SATURATION: 98 % | HEIGHT: 66 IN | DIASTOLIC BLOOD PRESSURE: 78 MMHG | HEART RATE: 70 BPM | SYSTOLIC BLOOD PRESSURE: 117 MMHG | WEIGHT: 293 LBS | BODY MASS INDEX: 47.09 KG/M2

## 2025-07-29 DIAGNOSIS — E66.813 CLASS 3 SEVERE OBESITY DUE TO EXCESS CALORIES WITH SERIOUS COMORBIDITY AND BODY MASS INDEX (BMI) OF 50.0 TO 59.9 IN ADULT: ICD-10-CM

## 2025-07-29 DIAGNOSIS — I87.2 VENOUS INSUFFICIENCY OF BOTH LOWER EXTREMITIES: Primary | ICD-10-CM

## 2025-07-29 DIAGNOSIS — R00.2 PALPITATIONS: ICD-10-CM

## 2025-07-29 DIAGNOSIS — I89.0 LYMPHEDEMA OF BOTH LOWER EXTREMITIES: ICD-10-CM

## 2025-07-29 PROCEDURE — 3008F BODY MASS INDEX DOCD: CPT | Mod: CPTII,,, | Performed by: INTERNAL MEDICINE

## 2025-07-29 PROCEDURE — 3044F HG A1C LEVEL LT 7.0%: CPT | Mod: CPTII,,, | Performed by: INTERNAL MEDICINE

## 2025-07-29 PROCEDURE — 1159F MED LIST DOCD IN RCRD: CPT | Mod: CPTII,,, | Performed by: INTERNAL MEDICINE

## 2025-07-29 PROCEDURE — 99999 PR PBB SHADOW E&M-EST. PATIENT-LVL IV: CPT | Mod: PBBFAC,,, | Performed by: INTERNAL MEDICINE

## 2025-07-29 PROCEDURE — 99215 OFFICE O/P EST HI 40 MIN: CPT | Mod: S$PBB,,, | Performed by: INTERNAL MEDICINE

## 2025-07-29 PROCEDURE — 99214 OFFICE O/P EST MOD 30 MIN: CPT | Mod: PBBFAC | Performed by: INTERNAL MEDICINE

## 2025-07-29 PROCEDURE — 3078F DIAST BP <80 MM HG: CPT | Mod: CPTII,,, | Performed by: INTERNAL MEDICINE

## 2025-07-29 PROCEDURE — 3074F SYST BP LT 130 MM HG: CPT | Mod: CPTII,,, | Performed by: INTERNAL MEDICINE

## 2025-07-29 NOTE — PROGRESS NOTES
[facility]      Chief Complaint   Patient presents with    Lymphedema of both lower extremities       Patient ID: Tawnya Sanz is a 24 y.o. female with MASLD, morbid obesity, who presents for a follow up appointment. Pertinent history/events are as follows:     -Pt kindly referred by Barb MACIAS for evaluation of localized swelling of left lower extremity .    -At our initial clinic visit on 11/7/2024, Ms. Sanz reports left leg swelling starting in 1/2022.  She has a bout of LLE cellulitis in 3/2022.  States leg swelling occurs daily.  No claudication symptoms or tissue loss. 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   Plan:  LLE Edema- Due to lymphedema and possible venous insufficiency. Check BLE venous reflux study and KASSIE study. Pt presents with findings consistent with mild lymphedema.  Refer to lymphedema clinic.  Recommend wearing graduated compression hose.  Limit sodium intake to 2000 mg daily.  Limit volume intake to 1.5 L daily.  Elevate legs when resting.  Morbid Obesity- Encourage diet, exercise, and weight loss.    -At follow up visit on 3/13/2025, Ms. Sanz reports some improvement left leg swelling. She has started lymphedema clinic therapy. She has no claudication symptoms or tissue loss. KASSIE Study on 11/14/2024 revealed normal resting ABIs bilaterally. BLE Venous Reflux Study 11/14/2024 demonstrated hemodynamically significant BLE venous reflux and no DVT.   Plan:  LLE Edema- Due to lymphedema and venous insufficiency. Improving. Continue lymphedema clinic therapy. KASSIE Study on 11/14/2024 revealed normal resting ABIs bilaterally. BLE Venous Reflux Study 11/14/2024 demonstrated hemodynamically significant BLE venous reflux and no DVT.  Recommend wearing graduated compression hose.  Limit sodium intake to 2000 mg daily.  Limit volume intake to 1.5 L daily.  Elevate legs when resting.  Morbid Obesity- Encourage diet, exercise, and weight  loss.    5/8/2025 clinic visit: Ms. Sanz reports left leg swelling is stable. She has completed lymphedema clinic therapy and continues to perform the techniques at home. She reports episodes of palpitation which have become more frequent recently.  Plan:  LLE Edema- Due to lymphedema and venous insufficiency. Improved and stable. Continue lymphedema clinic therapy techniques at home. Continue graduated compression hose.  Limit sodium intake to 2000 mg daily.  Limit volume intake to 1.5 L daily.  Elevate legs when resting.  Morbid Obesity- Encourage diet, exercise, and weight loss.  Palpitations- Check 30 day event monitor and echo.     HPI:  Ms. Sanz states palpitations are less frequent, but still occur. She has no chest pain or SOB. Left leg swelling is stable. Echo on 5/20/2025 revealed EF 55-60%; normal diastolic function; right ventricle is mildly dilated with normal systolic function; mild TR; estimated pulmonary artery systolic pressure is 30 mmHg; normal venous pressure at 3 mmHg.  Cardiac event monitor results are pending.     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    Chronic sinusitis, unspecified     Obesity, unspecified      Past Surgical History:   Procedure Laterality Date    WISDOM TOOTH EXTRACTION       Social History     Socioeconomic History    Marital status: Single   Occupational History    Occupation: medical student   Tobacco Use    Smoking status: Never    Smokeless tobacco: Never   Substance and Sexual Activity    Alcohol use: Yes     Comment: socially 1 x month    Drug use: Never    Sexual activity: Never   Social History Narrative    Medical student at      Social Drivers of Health     Financial Resource Strain: Low Risk  (1/15/2024)    Overall Financial Resource Strain (CARDIA)     Difficulty of Paying Living Expenses: Not very hard   Food Insecurity: No Food Insecurity (1/15/2024)    Hunger Vital Sign     Worried About  Running Out of Food in the Last Year: Never true     Ran Out of Food in the Last Year: Never true   Transportation Needs: No Transportation Needs (1/15/2024)    PRAPARE - Transportation     Lack of Transportation (Medical): No     Lack of Transportation (Non-Medical): No   Physical Activity: Unknown (3/13/2025)    Exercise Vital Sign     Days of Exercise per Week: 0 days   Stress: No Stress Concern Present (7/29/2024)    Received from [facility] of Occupational Health - Occupational Stress Questionnaire     Feeling of Stress : Only a little   Housing Stability: High Risk (1/15/2024)    Housing Stability Vital Sign     Unable to Pay for Housing in the Last Year: No     Number of Places Lived in the Last Year: 3     Unstable Housing in the Last Year: No     Family History   Problem Relation Name Age of Onset    Cancer Mother          thyroid    Hypertension Mother      Heart failure Mother      Diabetes Mother      Cancer Father          skin - unsure type       Review of patient's allergies indicates:  No Known Allergies    Medication List with Changes/Refills   Current Medications    CHLORHEXIDINE (HIBICLENS) 4 % EXTERNAL LIQUID    Wash upper thighs once daily as needed for folliculitis    CICLOPIROX (PENLAC) 8 % SOLN    Apply over nail and surrounding skin NIGHTLY.  Apply daily over previous coat. After seven (7) days, may remove with alcohol and continue cycle.    CICLOPIROX (PENLAC) 8 % SOLN    Apply topically nightly    CLINDAMYCIN PHOSPHATE (CLINDAGEL) 1 % GLQD    Apply 1 Application topically daily    CLINDAMYCIN PHOSPHATE (CLINDAGEL) 1 % GLQD    Apply 1 Application topically daily To thighs    CLOTRIMAZOLE (LOTRIMIN) 1 % CREAM    Apply topically 2 (two) times daily.    DOXYCYCLINE (VIBRA-TABS) 100 MG TABLET    Take 1 tablet (100 mg total) by mouth every 12 (twelve) hours.    FLUTICASONE PROPIONATE (FLONASE) 50 MCG/ACTUATION NASAL SPRAY    2 sprays by Nasal route daily    KETOCONAZOLE  "(NIZORAL) 2 % CREAM    Apply twice a day for two to three weeks until scaling has resolved. After resolved, continue treating for additional week.    KETOCONAZOLE (NIZORAL) 2 % CREAM    Apply twice a day for two to three weeks until scaling has resolved. After resolved, continue treating for additional week.    KETOCONAZOLE (NIZORAL) 2 % SHAMPOO    Use as shampoo; allow to sit on scalp for 3-5 minutes then rinse.  Do this 2-3 times a week    MEDROXYPROGESTERONE (PROVERA) 10 MG TABLET    Take 1 tablet (10 mg total) by mouth once daily.    NYSTATIN (MYCOSTATIN) POWDER    Apply to affected area 3 times daily    PROPYLENE GLYCOL 0.6 % DROP    Apply to eye as needed.    TERBINAFINE HCL (LAMISIL) 1 % CREAM    Apply topically 2 (two) times daily.    TIRZEPATIDE, WEIGHT LOSS, (ZEPBOUND) 2.5 MG/0.5 ML PNIJ    Inject 2.5 mg into the skin every 7 days.    TRIAMCINOLONE ACETONIDE 0.1% (KENALOG) 0.1 % CREAM    Apply twice a day as needed for itchy raised skin.  Don't use on face, armpits, or groin.       Review of Systems  Constitution: Denies chills, fever, and sweats.  HENT: Denies headaches or blurry vision.  Cardiovascular: Denies chest pain or irregular heart beat.  Respiratory: Denies cough or shortness of breath.  Gastrointestinal: Denies abdominal pain, nausea, or vomiting.  Musculoskeletal: Denies muscle cramps.  Neurological: Denies dizziness or focal weakness.  Psychiatric/Behavioral: Normal mental status.  Hematologic/Lymphatic: Denies bleeding problem or easy bruising/bleeding.  Skin: Denies rash or suspicious lesions    Physical Examination  /78   Pulse 70   Ht 5' 6" (1.676 m)   Wt (!) 150.8 kg (332 lb 7.3 oz)   LMP 07/18/2025   SpO2 98%   BMI 53.66 kg/m²     Constitutional: No acute distress, conversant  HEENT: Sclera anicteric, Pupils equal, round and reactive to light, extraocular motions intact, Oropharynx clear  Neck: No JVD, no carotid bruits  Cardiovascular: regular rate and rhythm, no murmur, " "rubs or gallops, normal S1/S2  Pulmonary: Clear to auscultation bilaterally  Abdominal: Abdomen soft, nontender, nondistended, positive bowel sounds  Extremities: No lower extremity edema,   Pulses:  Carotid pulses are 2+ on the right side, and 2+ on the left side.  Radial pulses are 2+ on the right side, and 2+ on the left side.   Femoral pulses are 2+ on the right side, and 2+ on the left side.  Popliteal pulses are 2+ on the right side, and 2+ on the left side.   Dorsalis pedis pulses are 2+ on the right side, and 2+ on the left side.   Posterior tibial pulses are 2+ on the right side, and 2+ on the left side.    Skin: No ecchymosis, erythema, or ulcers  Psych: Alert and oriented x 3, appropriate affect  Neuro: CNII-XII intact, no focal deficits    Labs:  Most Recent Data  CBC:   Lab Results   Component Value Date    WBC 10.14 09/25/2024    HGB 12.7 09/25/2024    HCT 39.2 09/25/2024     09/25/2024    MCV 87 09/25/2024    RDW 12.7 09/25/2024     BMP:   Lab Results   Component Value Date     09/25/2024    K 3.7 09/25/2024     09/25/2024    CO2 24 09/25/2024    BUN 12 09/25/2024    CREATININE 0.7 09/25/2024    GLU 95 09/25/2024    CALCIUM 10.1 09/25/2024     LFTS;   Lab Results   Component Value Date    PROT 6.9 10/22/2024    ALBUMIN 3.7 10/22/2024    BILITOT 0.5 10/22/2024    AST 14 10/22/2024    ALKPHOS 58 10/22/2024    ALT 22 10/22/2024     COAGS: No results found for: "INR", "PROTIME", "PTT"  FLP:   Lab Results   Component Value Date    CHOL 155 02/23/2024    HDL 53 02/23/2024    LDLCALC 91.6 02/23/2024    TRIG 52 02/23/2024    CHOLHDL 34.2 02/23/2024     CARDIAC: No results found for: "TROPONINI", "CKTOTAL", "CKMB", "BNP"    Imaging:    KASSIE Study 11/14/2024:    Normal resting ABIs bilaterally.    PVR waveforms are mildly dampened at the low thigh level bilaterally, and ankle level bilaterally.    Echo 5/20/2025:    Left Ventricle: The left ventricle is normal in size. Normal wall thickness. " There is normal systolic function with a visually estimated ejection fraction of 55 - 60%. There is normal diastolic function.    Right Ventricle: The right ventricle is mildly dilated Systolic function is normal.    Tricuspid Valve: There is mild regurgitation.    Pulmonary Artery: The estimated pulmonary artery systolic pressure is 30 mmHg.    IVC/SVC: Normal venous pressure at 3 mmHg.    BLE Venous Reflux Study 11/14/2024:    There is no evidence of a right lower extremity DVT.    The right common femoral vein has reflux.    The right greater saphenous vein has reflux.    There is no evidence of a left lower extremity DVT.    The left common femoral vein has reflux.    The left greater saphenous vein has reflux.     Assessment/Plan:  Tawnya Sanz is a 24 y.o. female with MASLD, morbid obesity, who presents for a follow up appointment.     LLE Edema- Due to lymphedema and venous insufficiency. Remains improved and stable. Continue lymphedema clinic therapy techniques at home. Continue graduated compression hose.  Limit sodium intake to 2000 mg daily.  Limit volume intake to 1.5 L daily.  Elevate legs when resting.    2. Morbid Obesity- Encourage diet, exercise, and weight loss.    3. Palpitations- Ms. Sanz states palpitations are less frequent, but still occur. She has no chest pain or SOB. Echo on 5/20/2025 revealed EF 55-60%; normal diastolic function; right ventricle is mildly dilated with normal systolic function; mild TR; estimated pulmonary artery systolic pressure is 30 mmHg; normal venous pressure at 3 mmHg.  Cardiac event monitor results are pending- will follow up.     Follow-up in 2 months    Total duration of face to face visit time 20 minutes.  Total time spent counseling greater than fifty percent of total visit time.  Counseling included discussion regarding imaging findings, diagnosis, possibilities, treatment options, risks and benefits.  The patient had many questions regarding the  options and long-term effects.    Bentley Pittman MD, PhD  Interventional Cardiology

## 2025-07-29 NOTE — TELEPHONE ENCOUNTER
Called patient to inform that we will have to reschedule her virtual appointment as Dr. Salcedo will be in surgery. Voice mail  box was full and could not leave message. Will send patient message to follow up.

## 2025-07-29 NOTE — PATIENT INSTRUCTIONS
Assessment/Plan:  Tawnya Sanz is a 24 y.o. female with MASLD, morbid obesity, who presents for a follow up appointment.     LLE Edema- Due to lymphedema and venous insufficiency. Remains improved and stable. Continue lymphedema clinic therapy techniques at home. Continue graduated compression hose.  Limit sodium intake to 2000 mg daily.  Limit volume intake to 1.5 L daily.  Elevate legs when resting.    2. Morbid Obesity- Encourage diet, exercise, and weight loss.    3. Palpitations- Ms. Sanz states palpitations are less frequent, but still occur. She has no chest pain or SOB. Echo on 5/20/2025 revealed EF 55-60%; normal diastolic function; right ventricle is mildly dilated with normal systolic function; mild TR; estimated pulmonary artery systolic pressure is 30 mmHg; normal venous pressure at 3 mmHg.  Cardiac event monitor results are pending- will follow up.     Follow-up in 2 months

## 2025-07-31 ENCOUNTER — OFFICE VISIT (OUTPATIENT)
Dept: SPORTS MEDICINE | Facility: CLINIC | Age: 25
End: 2025-07-31
Payer: MEDICAID

## 2025-07-31 DIAGNOSIS — M22.2X1 PATELLOFEMORAL PAIN SYNDROME OF RIGHT KNEE: Primary | ICD-10-CM

## 2025-07-31 DIAGNOSIS — M25.561 RIGHT KNEE PAIN, UNSPECIFIED CHRONICITY: ICD-10-CM

## 2025-07-31 DIAGNOSIS — M25.861 IMPINGEMENT SYNDROME INVOLVING PATELLAR FAT PAD OF RIGHT KNEE: ICD-10-CM

## 2025-07-31 NOTE — PATIENT INSTRUCTIONS
ASSESSMENT:    Tawnya Sanz is a 24 y.o. female with continued right anterior knee pain    Encounter Diagnoses   Name Primary?    Patellofemoral pain syndrome of right knee Yes    Impingement syndrome involving patellar fat pad of right knee     Right knee pain, unspecified chronicity         PLAN:    Medications:  Over-the-counter NSAIDs or Tylenol as needed for pain and inflammation  PT/OT:  Home exercise program for patellofemoral pain syndrome  Candidate for physical therapy  Advanced Imaging:  MRI scan of the right knee reviewed with the patient today in clinic  Images were also reviewed by Dr. Salcedo and we discussed his interpretation with the patient on the phone as well  If no improvement by next office visit, treatment options include:  Physical therapy  Right knee intra-articular corticosteroid injection  Surgical options  Return to Clinic:  Six weeks or as needed if symptoms improve   Return to clinic sooner if any symptoms change or worsen.  Imaging needed at next office visit:  None    Other:  The patient was taught a homegoing physical therapy regimen for patellofemoral pain syndrome. The patient demonstrated understanding of the exercises and proper technique of their execution. This interaction took 15 minutes and included demonstration of the exercises as well as counseling to encourage the patient to do the exercises regularly.

## 2025-07-31 NOTE — PROGRESS NOTES
Patient ID: Tawnya Sanz  YOB: 2000  MRN: [id number]    Chief Complaint:  Right knee pain    Referred By: Dr. 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  Seen today he has discuss MRI results of the right knee.  MRI report shows no findings of meniscus injury or ligament injury.  There is Partial-thickness chondral thinning over the patellar median ridge.  Also noted Superolateral Hoffa's fat pad edema, which may be seen with patellar tendon-lateral femoral condyle friction syndrome.  He states her right knee primarily gives her trouble when she goes to stand from a seated position, going up and down stairs, and kneeling directly on her right knee.  We will occasionally get some clicking and popping particularly with sitting or knee.  Concerned she may have an underlying meniscus tear resulted in the MRI being ordered.  Denies any fever night sweats or chills.  No numbness or tingling.  No new orthopedic related complaints compared to last office visit.    The patient location is: [facility]    Each patient to whom he or she provides medical services by telemedicine is:    (1) informed of the relationship between the physician and patient and the respective role of any other health care provider with respect to management of the patient; and   (2) notified that he or she may decline to receive medical services by telemedicine and may withdraw from such care at any time.  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    Chronic sinusitis, unspecified     Obesity, unspecified      Past Surgical History:   Procedure  Laterality Date    WISDOM TOOTH EXTRACTION       Family History   Problem Relation Name Age of Onset    Cancer Mother          thyroid    Hypertension Mother      Heart failure Mother      Diabetes Mother      Cancer Father          skin - unsure type     Social History[1]  Medication List with Changes/Refills   Current Medications    CHLORHEXIDINE (HIBICLENS) 4 % EXTERNAL LIQUID    Wash upper thighs once daily as needed for folliculitis    CICLOPIROX (PENLAC) 8 % SOLN    Apply over nail and surrounding skin NIGHTLY.  Apply daily over previous coat. After seven (7) days, may remove with alcohol and continue cycle.    CICLOPIROX (PENLAC) 8 % SOLN    Apply topically nightly    CLINDAMYCIN PHOSPHATE (CLINDAGEL) 1 % GLQD    Apply 1 Application topically daily    CLINDAMYCIN PHOSPHATE (CLINDAGEL) 1 % GLQD    Apply 1 Application topically daily To thighs    CLOTRIMAZOLE (LOTRIMIN) 1 % CREAM    Apply topically 2 (two) times daily.    DOXYCYCLINE (VIBRA-TABS) 100 MG TABLET    Take 1 tablet (100 mg total) by mouth every 12 (twelve) hours.    FLUTICASONE PROPIONATE (FLONASE) 50 MCG/ACTUATION NASAL SPRAY    2 sprays by Nasal route daily    KETOCONAZOLE (NIZORAL) 2 % CREAM    Apply twice a day for two to three weeks until scaling has resolved. After resolved, continue treating for additional week.    KETOCONAZOLE (NIZORAL) 2 % CREAM    Apply twice a day for two to three weeks until scaling has resolved. After resolved, continue treating for additional week.    KETOCONAZOLE (NIZORAL) 2 % SHAMPOO    Use as shampoo; allow to sit on scalp for 3-5 minutes then rinse.  Do this 2-3 times a week    MEDROXYPROGESTERONE (PROVERA) 10 MG TABLET    Take 1 tablet (10 mg total) by mouth once daily.    NYSTATIN (MYCOSTATIN) POWDER    Apply to affected area 3 times daily    PROPYLENE GLYCOL 0.6 % DROP    Apply to eye as needed.    TERBINAFINE HCL (LAMISIL) 1 % CREAM    Apply topically 2 (two) times daily.    TIRZEPATIDE, WEIGHT LOSS, (ZEPBOUND)  2.5 MG/0.5 ML PNIJ    Inject 2.5 mg into the skin every 7 days.    TRIAMCINOLONE ACETONIDE 0.1% (KENALOG) 0.1 % CREAM    Apply twice a day as needed for itchy raised skin.  Don't use on face, armpits, or groin.     Review of patient's allergies indicates:  No Known Allergies  Review of Systems   Constitutional: Negative for chills, fever, night sweats, weight gain and weight loss.   Respiratory:  Negative for shortness of breath.    Skin:  Negative for rash and suspicious lesions.   Gastrointestinal:  Negative for bowel incontinence, nausea and vomiting.   Genitourinary:  Negative for bladder incontinence.   Neurological:  Negative for numbness, paresthesias and sensory change.   Psychiatric/Behavioral:  Negative for altered mental status.        PHYSICAL EXAM:   There is no height or weight on file to calculate BMI.  There were no vitals filed for this visit.   GENERAL: Well appearing, appropriate for stated age, no acute distress.  NEURO: Awake, alert, and oriented x 3.  PSYCH: Mood & affect are appropriate.    Ortho/SPM Exam    Limited physical exam due to virtual visit      IMAGING:    Results for orders placed or performed during the hospital encounter of 07/01/25 (from the past 2160 hours)   MRI Knee Without Contrast Right    Impression    1. No evidence for meniscus tear.  2. Partial-thickness chondral thinning over the patellar median ridge.  3. Superolateral Hoffa's fat pad edema, which may be seen with patellar tendon-lateral femoral condyle friction syndrome.    Electronically signed by resident: Charity Victor  Date:    07/01/2025  Time:    09:31    Electronically signed by: Jose Shelton MD  Date:    07/01/2025  Time:    11:37      Relevant imaging results reviewed and interpreted by me, discussed with the patient and / or family today.       ASSESSMENT / PLAN:    Patient Instructions         ASSESSMENT:    Tawnya Sanz is a 24 y.o. female with continued right anterior knee pain    Encounter Diagnoses    Name Primary?    Patellofemoral pain syndrome of right knee Yes    Impingement syndrome involving patellar fat pad of right knee     Right knee pain, unspecified chronicity         PLAN:    Medications:  Over-the-counter NSAIDs or Tylenol as needed for pain and inflammation  PT/OT:  Home exercise program for patellofemoral pain syndrome  Candidate for physical therapy  Advanced Imaging:  MRI scan of the right knee reviewed with the patient today in clinic  Images were also reviewed by Dr. Salcedo and we discussed his interpretation with the patient on the phone as well  If no improvement by next office visit, treatment options include:  Physical therapy  Right knee intra-articular corticosteroid injection  Surgical options  Return to Clinic:  Six weeks or as needed if symptoms improve   Return to clinic sooner if any symptoms change or worsen.  Imaging needed at next office visit:  None    Other:  The patient was taught a homegoing physical therapy regimen for patellofemoral pain syndrome. The patient demonstrated understanding of the exercises and proper technique of their execution. This interaction took 15 minutes and included demonstration of the exercises as well as counseling to encourage the patient to do the exercises regularly.                          Pro Mario PA-C  Sports Medicine Physician Assistant     Disclaimer:   This note was prepared using a voice recognition system and is likely to have sound alike errors within the text.   I discussed worrisome and red flag signs and symptoms with the patient. The patient expressed understanding and agreed to alert me immediately or to go to the emergency room if they experience any of these.   Treatment plan was developed with input from the patient/family, and they expressed understanding and agreement with the plan. All questions were answered today.       [1]   Social History  Socioeconomic History    Marital status: Single   Occupational History     Occupation: medical student   Tobacco Use    Smoking status: Never    Smokeless tobacco: Never   Substance and Sexual Activity    Alcohol use: Yes     Comment: socially 1 x month    Drug use: Never    Sexual activity: Never   Social History Narrative    Medical student at      Social Drivers of Health     Financial Resource Strain: Low Risk  (1/15/2024)    Overall Financial Resource Strain (CARDIA)     Difficulty of Paying Living Expenses: Not very hard   Food Insecurity: No Food Insecurity (1/15/2024)    Hunger Vital Sign     Worried About Running Out of Food in the Last Year: Never true     Ran Out of Food in the Last Year: Never true   Transportation Needs: No Transportation Needs (1/15/2024)    PRAPARE - Transportation     Lack of Transportation (Medical): No     Lack of Transportation (Non-Medical): No   Physical Activity: Unknown (3/13/2025)    Exercise Vital Sign     Days of Exercise per Week: 0 days   Stress: No Stress Concern Present (7/29/2024)    Received from [facility]    [facility] of Occupational Health - Occupational Stress Questionnaire     Feeling of Stress : Only a little   Housing Stability: High Risk (1/15/2024)    Housing Stability Vital Sign     Unable to Pay for Housing in the Last Year: No     Number of Places Lived in the Last Year: 3     Unstable Housing in the Last Year: No